# Patient Record
Sex: MALE | Race: WHITE | NOT HISPANIC OR LATINO | URBAN - METROPOLITAN AREA
[De-identification: names, ages, dates, MRNs, and addresses within clinical notes are randomized per-mention and may not be internally consistent; named-entity substitution may affect disease eponyms.]

---

## 2020-07-07 ENCOUNTER — INPATIENT (INPATIENT)
Facility: HOSPITAL | Age: 80
LOS: 6 days | Discharge: NURSING, REHAB OR LONG-TERM CARE | DRG: 435 | End: 2020-07-14
Attending: STUDENT IN AN ORGANIZED HEALTH CARE EDUCATION/TRAINING PROGRAM | Admitting: STUDENT IN AN ORGANIZED HEALTH CARE EDUCATION/TRAINING PROGRAM
Payer: MEDICARE

## 2020-07-07 VITALS
DIASTOLIC BLOOD PRESSURE: 56 MMHG | OXYGEN SATURATION: 94 % | WEIGHT: 164.91 LBS | RESPIRATION RATE: 18 BRPM | SYSTOLIC BLOOD PRESSURE: 94 MMHG | HEART RATE: 70 BPM | HEIGHT: 72 IN

## 2020-07-07 DIAGNOSIS — R18.8 OTHER ASCITES: ICD-10-CM

## 2020-07-07 DIAGNOSIS — D64.9 ANEMIA, UNSPECIFIED: ICD-10-CM

## 2020-07-07 DIAGNOSIS — I26.99 OTHER PULMONARY EMBOLISM WITHOUT ACUTE COR PULMONALE: ICD-10-CM

## 2020-07-07 DIAGNOSIS — E13.29 OTHER SPECIFIED DIABETES MELLITUS WITH OTHER DIABETIC KIDNEY COMPLICATION: ICD-10-CM

## 2020-07-07 DIAGNOSIS — R63.8 OTHER SYMPTOMS AND SIGNS CONCERNING FOOD AND FLUID INTAKE: ICD-10-CM

## 2020-07-07 LAB
ALBUMIN SERPL ELPH-MCNC: 1.8 G/DL — LOW (ref 3.4–5)
ALP SERPL-CCNC: 115 U/L — SIGNIFICANT CHANGE UP (ref 40–120)
ALT FLD-CCNC: 23 U/L — SIGNIFICANT CHANGE UP (ref 12–42)
AMMONIA BLD-MCNC: <10 UMOL/L — LOW (ref 11–32)
ANION GAP SERPL CALC-SCNC: 5 MMOL/L — LOW (ref 9–16)
APTT BLD: 69.3 SEC — HIGH (ref 27.5–35.5)
AST SERPL-CCNC: 27 U/L — SIGNIFICANT CHANGE UP (ref 15–37)
BILIRUB SERPL-MCNC: 0.4 MG/DL — SIGNIFICANT CHANGE UP (ref 0.2–1.2)
BUN SERPL-MCNC: 27 MG/DL — HIGH (ref 7–23)
CALCIUM SERPL-MCNC: 8.8 MG/DL — SIGNIFICANT CHANGE UP (ref 8.5–10.5)
CHLORIDE SERPL-SCNC: 104 MMOL/L — SIGNIFICANT CHANGE UP (ref 96–108)
CK SERPL-CCNC: 415 U/L — HIGH (ref 39–308)
CO2 SERPL-SCNC: 28 MMOL/L — SIGNIFICANT CHANGE UP (ref 22–31)
CREAT SERPL-MCNC: 1.08 MG/DL — SIGNIFICANT CHANGE UP (ref 0.5–1.3)
ETHANOL SERPL-MCNC: <3 MG/DL — SIGNIFICANT CHANGE UP
GLUCOSE BLDC GLUCOMTR-MCNC: 139 MG/DL — HIGH (ref 70–99)
GLUCOSE BLDC GLUCOMTR-MCNC: 153 MG/DL — HIGH (ref 70–99)
GLUCOSE SERPL-MCNC: 165 MG/DL — HIGH (ref 70–99)
HCT VFR BLD CALC: 23.4 % — LOW (ref 39–50)
HCT VFR BLD CALC: 26.3 % — LOW (ref 39–50)
HGB BLD-MCNC: 7.6 G/DL — LOW (ref 13–17)
HGB BLD-MCNC: 8.2 G/DL — LOW (ref 13–17)
INR BLD: 1.47 — HIGH (ref 0.88–1.16)
LACTATE SERPL-SCNC: 1.7 MMOL/L — SIGNIFICANT CHANGE UP (ref 0.4–2)
LIDOCAIN IGE QN: 21 U/L — LOW (ref 73–393)
MCHC RBC-ENTMCNC: 26.6 PG — LOW (ref 27–34)
MCHC RBC-ENTMCNC: 27.1 PG — SIGNIFICANT CHANGE UP (ref 27–34)
MCHC RBC-ENTMCNC: 31.2 GM/DL — LOW (ref 32–36)
MCHC RBC-ENTMCNC: 32.5 GM/DL — SIGNIFICANT CHANGE UP (ref 32–36)
MCV RBC AUTO: 83.6 FL — SIGNIFICANT CHANGE UP (ref 80–100)
MCV RBC AUTO: 85.4 FL — SIGNIFICANT CHANGE UP (ref 80–100)
NRBC # BLD: 0 /100 WBCS — SIGNIFICANT CHANGE UP (ref 0–0)
NRBC # BLD: 0 /100 WBCS — SIGNIFICANT CHANGE UP (ref 0–0)
PCO2 BLDV: 40 MMHG — LOW (ref 41–51)
PH BLDV: 7.43 — SIGNIFICANT CHANGE UP (ref 7.32–7.43)
PLATELET # BLD AUTO: 155 K/UL — SIGNIFICANT CHANGE UP (ref 150–400)
PLATELET # BLD AUTO: 162 K/UL — SIGNIFICANT CHANGE UP (ref 150–400)
PO2 BLDV: 32 MMHG — LOW (ref 35–40)
POTASSIUM SERPL-MCNC: 4.1 MMOL/L — SIGNIFICANT CHANGE UP (ref 3.5–5.3)
POTASSIUM SERPL-SCNC: 4.1 MMOL/L — SIGNIFICANT CHANGE UP (ref 3.5–5.3)
PROT SERPL-MCNC: 6.7 G/DL — SIGNIFICANT CHANGE UP (ref 6.4–8.2)
PROTHROM AB SERPL-ACNC: 17 SEC — HIGH (ref 10.6–13.6)
RBC # BLD: 2.8 M/UL — LOW (ref 4.2–5.8)
RBC # BLD: 3.08 M/UL — LOW (ref 4.2–5.8)
RBC # FLD: 14.1 % — SIGNIFICANT CHANGE UP (ref 10.3–14.5)
RBC # FLD: 14.2 % — SIGNIFICANT CHANGE UP (ref 10.3–14.5)
SAO2 % BLDV: 61 % — SIGNIFICANT CHANGE UP
SARS-COV-2 RNA SPEC QL NAA+PROBE: SIGNIFICANT CHANGE UP
SODIUM SERPL-SCNC: 137 MMOL/L — SIGNIFICANT CHANGE UP (ref 132–145)
TROPONIN I SERPL-MCNC: 0.03 NG/ML — SIGNIFICANT CHANGE UP (ref 0.02–0.06)
TSH SERPL-MCNC: 2.57 UIU/ML — SIGNIFICANT CHANGE UP (ref 0.36–3.74)
WBC # BLD: 12.55 K/UL — HIGH (ref 3.8–10.5)
WBC # BLD: 8.52 K/UL — SIGNIFICANT CHANGE UP (ref 3.8–10.5)
WBC # FLD AUTO: 12.55 K/UL — HIGH (ref 3.8–10.5)
WBC # FLD AUTO: 8.52 K/UL — SIGNIFICANT CHANGE UP (ref 3.8–10.5)

## 2020-07-07 PROCEDURE — 99222 1ST HOSP IP/OBS MODERATE 55: CPT

## 2020-07-07 PROCEDURE — 70450 CT HEAD/BRAIN W/O DYE: CPT | Mod: 26

## 2020-07-07 PROCEDURE — 99285 EMERGENCY DEPT VISIT HI MDM: CPT

## 2020-07-07 PROCEDURE — 93010 ELECTROCARDIOGRAM REPORT: CPT

## 2020-07-07 PROCEDURE — 74177 CT ABD & PELVIS W/CONTRAST: CPT | Mod: 26

## 2020-07-07 PROCEDURE — 99223 1ST HOSP IP/OBS HIGH 75: CPT | Mod: GC

## 2020-07-07 PROCEDURE — 71275 CT ANGIOGRAPHY CHEST: CPT | Mod: 26

## 2020-07-07 PROCEDURE — 71045 X-RAY EXAM CHEST 1 VIEW: CPT | Mod: 26

## 2020-07-07 PROCEDURE — 74183 MRI ABD W/O CNTR FLWD CNTR: CPT | Mod: 26

## 2020-07-07 RX ORDER — ONDANSETRON 8 MG/1
4 TABLET, FILM COATED ORAL ONCE
Refills: 0 | Status: COMPLETED | OUTPATIENT
Start: 2020-07-07 | End: 2020-07-07

## 2020-07-07 RX ORDER — HEPARIN SODIUM 5000 [USP'U]/ML
6000 INJECTION INTRAVENOUS; SUBCUTANEOUS ONCE
Refills: 0 | Status: COMPLETED | OUTPATIENT
Start: 2020-07-07 | End: 2020-07-07

## 2020-07-07 RX ORDER — SODIUM CHLORIDE 9 MG/ML
1000 INJECTION INTRAMUSCULAR; INTRAVENOUS; SUBCUTANEOUS ONCE
Refills: 0 | Status: COMPLETED | OUTPATIENT
Start: 2020-07-07 | End: 2020-07-07

## 2020-07-07 RX ORDER — IOHEXOL 300 MG/ML
30 INJECTION, SOLUTION INTRAVENOUS ONCE
Refills: 0 | Status: COMPLETED | OUTPATIENT
Start: 2020-07-07 | End: 2020-07-07

## 2020-07-07 RX ORDER — HEPARIN SODIUM 5000 [USP'U]/ML
INJECTION INTRAVENOUS; SUBCUTANEOUS
Qty: 25000 | Refills: 0 | Status: DISCONTINUED | OUTPATIENT
Start: 2020-07-07 | End: 2020-07-08

## 2020-07-07 RX ORDER — HEPARIN SODIUM 5000 [USP'U]/ML
3000 INJECTION INTRAVENOUS; SUBCUTANEOUS EVERY 6 HOURS
Refills: 0 | Status: DISCONTINUED | OUTPATIENT
Start: 2020-07-07 | End: 2020-07-07

## 2020-07-07 RX ORDER — HEPARIN SODIUM 5000 [USP'U]/ML
6000 INJECTION INTRAVENOUS; SUBCUTANEOUS EVERY 6 HOURS
Refills: 0 | Status: DISCONTINUED | OUTPATIENT
Start: 2020-07-07 | End: 2020-07-07

## 2020-07-07 RX ORDER — SODIUM CHLORIDE 9 MG/ML
1000 INJECTION, SOLUTION INTRAVENOUS
Refills: 0 | Status: DISCONTINUED | OUTPATIENT
Start: 2020-07-07 | End: 2020-07-08

## 2020-07-07 RX ORDER — SENNA PLUS 8.6 MG/1
2 TABLET ORAL AT BEDTIME
Refills: 0 | Status: DISCONTINUED | OUTPATIENT
Start: 2020-07-07 | End: 2020-07-14

## 2020-07-07 RX ADMIN — SODIUM CHLORIDE 80 MILLILITER(S): 9 INJECTION, SOLUTION INTRAVENOUS at 18:18

## 2020-07-07 RX ADMIN — HEPARIN SODIUM 6000 UNIT(S): 5000 INJECTION INTRAVENOUS; SUBCUTANEOUS at 11:52

## 2020-07-07 RX ADMIN — IOHEXOL 30 MILLILITER(S): 300 INJECTION, SOLUTION INTRAVENOUS at 06:33

## 2020-07-07 RX ADMIN — SENNA PLUS 2 TABLET(S): 8.6 TABLET ORAL at 20:51

## 2020-07-07 RX ADMIN — HEPARIN SODIUM 1300 UNIT(S)/HR: 5000 INJECTION INTRAVENOUS; SUBCUTANEOUS at 11:51

## 2020-07-07 RX ADMIN — ONDANSETRON 4 MILLIGRAM(S): 8 TABLET, FILM COATED ORAL at 06:32

## 2020-07-07 RX ADMIN — SODIUM CHLORIDE 1000 MILLILITER(S): 9 INJECTION INTRAMUSCULAR; INTRAVENOUS; SUBCUTANEOUS at 06:33

## 2020-07-07 NOTE — ED ADULT NURSE REASSESSMENT NOTE - NS ED NURSE REASSESS COMMENT FT1
pt laying on stretcher awake, alert and oriented x3, respirations even and unlabored. no c/o pain at this time. vital signs checked and WNL. will continue to monitor.

## 2020-07-07 NOTE — H&P ADULT - ASSESSMENT
79 yo male pmhx DM2 noninsulin dependent bibems for acute onset PE. Endorses 20lb weight loss, and back pain occurring with stomach pain 81 yo male pmhx DM2 noninsulin dependent bibems for acute onset PE. Endorses 20lb weight loss, and back pain occurring with stomach pain and now with cystic pancreatic mass and renal cyst with ascites and anemia

## 2020-07-07 NOTE — CONSULT NOTE ADULT - SUBJECTIVE AND OBJECTIVE BOX
GASTROENTEROLOGY CONSULT NOTE  HPI:  81 yo male pmhx DM2 noninsulin dependent bibems for acute onset profound weakness. Pt states he could not support himself last night and guided himself to the floor from which he could not stand up. He states he was on floor approximately 6-7 hours and was finally able to crawl to phone and call 911. Pt also endorses a back pain for 2 months that progressed to a back and stomach pain (does not endorse radiation). Simultaneously he has also noticed that he has decreased energy. Over the course of 3months 20lb weight loss with no change in appetite He denies cp/fever/cough/urinary incontinence/fever/chills. Pt endorses decreased appetite over past 2 months and abdominal distension x 2 months. Denies cp/soa. Denies black/bloody stools. (07 Jul 2020 13:53)    Allergies    No Known Allergies    Intolerances      Home Medications:  metFORMIN 500 mg oral tablet: 1 tab(s) orally 2 times a day (07 Jul 2020 06:19)    MEDICATIONS:  MEDICATIONS  (STANDING):  heparin  Infusion.  Unit(s)/Hr (13 mL/Hr) IV Continuous <Continuous>  lactated ringers. 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, with long-term current use of insulin  No significant past surgical history    FAMILY HISTORY:  No pertinent family history in first degree relatives    SOCIAL HISTORY:  [place]: Denies  Alcohol: Denies  Illicit Drugs: Denies    REVIEW OF SYSTEMS:  CONSTITUTIONAL: Weakness and fatigue as above  HEENT: No visual changes; No vertigo or throat pain   NECK: No pain or stiffness  RESPIRATORY: No cough, wheezing, hemoptysis; No shortness of breath  CARDIOVASCULAR: No chest pain or palpitations  GASTROINTESTINAL: Abdominal fullness and a "heat" through the abdomen, no nausea or vomiting  GENITOURINARY: No dysuria, frequency or hematuria  NEUROLOGICAL: No numbness or weakness  SKIN: No itching, burning, rashes, or lesions   All other 10 review of systems is negative unless indicated above.    Vital Signs Last 24 Hrs  T(C): 36.9 (07 Jul 2020 17:49), Max: 37.4 (07 Jul 2020 14:50)  T(F): 98.4 (07 Jul 2020 17:49), Max: 99.3 (07 Jul 2020 14:50)  HR: 84 (07 Jul 2020 18:06) (64 - 98)  BP: 95/59 (07 Jul 2020 18:06) (91/52 - 121/69)  BP(mean): 72 (07 Jul 2020 18:06) (67 - 72)  RR: 20 (07 Jul 2020 18:06) (16 - 20)  SpO2: 95% (07 Jul 2020 18:06) (94% - 96%)    07-07 @ 07:01  -  07-07 @ 18:43  --------------------------------------------------------  IN: 564 mL / OUT: 0 mL / NET: 564 mL        PHYSICAL EXAM:    General: Cachectic, thin  Eyes: Anicteric sclerae, moist conjunctivae  HENT: Moist mucous membranes  Neck: Trachea midline, supple  Lungs: Normal respiratory effort, no intercostal retractions  Cardiovascular: RRR  Abdomen: Mildly distended, non-tender  Extremities: Normal range of motion, No clubbing, cyanosis or edema  Neurological: Alert and oriented x3  Skin: Warm and dry. No obvious rash    LABS:                        8.2    12.55 )-----------( 162      ( 07 Jul 2020 17:50 )             26.3     07-07    137  |  104  |  27<H>  ----------------------------<  165<H>  4.1   |  28  |  1.08    Ca    8.8      07 Jul 2020 06:26    TPro  6.7  /  Alb  1.8<L>  /  TBili  0.4  /  DBili  x   /  AST  27  /  ALT  23  /  AlkPhos  115  07-07        PT/INR - ( 07 Jul 2020 06:26 )   PT: 17.0 sec;   INR: 1.47          PTT - ( 07 Jul 2020 17:50 )  PTT:69.3 sec    RADIOLOGY & ADDITIONAL STUDIES:     Reviewed

## 2020-07-07 NOTE — CONSULT NOTE ADULT - ASSESSMENT
81yo M with DM2 BIBEMS for weakness, inability to ambulate, prompting him to call EMS found to have b/l PEs and abnormal pancreatic imaging on CT.    1. Abnormal imaging - Patient with an abdominal "fullness and heat" which radiates to the back, has been ongoing for ~1 month. Denies nausea, vomiting, fevers, chills, abdominal pain. Found to have b/l PE and innumerable thin, septated, cystic pancreatic lesions. Questionable solid lesion in the stomach with occlusion of the splenic vein. Unclear at this time what the etiology of these cystic lesions is. Given solid component in the tail with constellation of weakness, cachexia, weight loss, and hypercoagulability, concern for malignancy.  - Obtain MR/MRCP to further characterize these findings  - Would recommend Heparin for anticoagulation at this time should EUS w/FNA be required  - Further recommendations pending MR    Recommendations discussed with primary team  Case discussed with attending physician 81yo M with DM2 BIBEMS for weakness, inability to ambulate, prompting him to call EMS found to have b/l PEs and abnormal pancreatic imaging on CT.    1. Abnormal imaging - Patient with an abdominal "fullness and heat" which radiates to the back, has been ongoing for ~1 month. Denies nausea, vomiting, fevers, chills, abdominal pain. Found to have b/l PE and innumerable thin, septated, cystic pancreatic lesions. Questionable solid lesion in the stomach with occlusion of the splenic vein. Unclear at this time what the etiology of these cystic lesions is. Given solid component in the tail with constellation of weakness, cachexia, weight loss, and hypercoagulability, concern for malignancy.  - Obtain MR/MRCP to further characterize these findings  - Patient reports recent hospitalization at [facility] with same complaints, believes he had a CT A/P at that time, would obtain any imaging to understand acuity of findings  - Would recommend Heparin for anticoagulation at this time should EUS w/FNA be required  - Further recommendations pending MR    Recommendations discussed with primary team  Case discussed with attending physician

## 2020-07-07 NOTE — ED ADULT NURSE NOTE - NSIMPLEMENTINTERV_GEN_ALL_ED
Implemented All Fall with Harm Risk Interventions:  [place] to call system. Call bell, personal items and telephone within reach. Instruct patient to call for assistance. Room bathroom lighting operational. Non-slip footwear when patient is off stretcher. Physically safe environment: no spills, clutter or unnecessary equipment. Stretcher in lowest position, wheels locked, appropriate side rails in place. Provide visual cue, wrist band, yellow gown, etc. Monitor gait and stability. Monitor for mental status changes and reorient to person, place, and time. Review medications for side effects contributing to fall risk. Reinforce activity limits and safety measures with patient and family. Provide visual clues: red socks.

## 2020-07-07 NOTE — ED ADULT TRIAGE NOTE - CHIEF COMPLAINT QUOTE
YISSEL from home after being found on the floor by ems. As per patient, he slid one foot to the floor from the sofa due to weakness and then was unable to get up for eight hours. eventually pt crawled to the phone and was able to call 911. Pt arrives alert awake and responsive, complains of abdominal and back pain.

## 2020-07-07 NOTE — H&P ADULT - NSHPPHYSICALEXAM_GEN_ALL_CORE
PHYSICAL EXAM:  	GEN: Cachetic elderly male, appears comfortable  	HEENT:  EOMI, dry MM, no pharyngeal erythema or exudate  	CV: S1/S2, no murmurs appreciated, no JVD, no carotid bruits  	RESP: CTA bilaterally, good respiratory effort, good air movement, no wheezes/rales  	ABD: tense BS+4quadrants, nontender, distended tense dullness to percussion in 4 quad   	EXTREMITIES: warm, pulses 2+ in all 4 extremities, +1 LE edema  	MSK: full range of motion of all 4 extremities  	SKIN: dry, intact, no rashes  	NEURO: A&Ox3, no focal deficits, strength 5/5 throughout, no sensory deficits. Tremulous.   PSYC: normal mood/affect

## 2020-07-07 NOTE — H&P ADULT - HISTORY OF PRESENT ILLNESS
79 yo male pmhx DM2 noninsulin dependent bibems for acute onset profound weakness. Pt states he could not support himself last night and guided himself to the floor from which he could not stand up. He states he was on floor approximately 6-7 hours and was finally able to crawl to phone and call 911. He denies cp/fever/cough/urinary incontinence/fever/chills. Pt endorses decreased appetite over past 2 months and abdominal distension x 2 months. Denies cp/soa. Denies black/bloody stools. 79 yo male pmhx DM2 noninsulin dependent bibems for acute onset profound weakness. Pt states he could not support himself last night and guided himself to the floor from which he could not stand up. He states he was on floor approximately 6-7 hours and was finally able to crawl to phone and call 911. Pt also endorses a back pain for 2 months that progressed to a back and stomach pain. Simultaneously he has also noticed that he has decreased energy. Over the course of 3months 20lb weight loss with no change in appetite He denies cp/fever/cough/urinary incontinence/fever/chills. Pt endorses decreased appetite over past 2 months and abdominal distension x 2 months. Denies cp/soa. Denies black/bloody stools. 79 yo male pmhx DM2 noninsulin dependent bibems for acute onset profound weakness. Pt states he could not support himself last night and guided himself to the floor from which he could not stand up. He states he was on floor approximately 6-7 hours and was finally able to crawl to phone and call 911. Pt also endorses a back pain for 2 months that progressed to a back and stomach pain (does not endorse radiation). Simultaneously he has also noticed that he has decreased energy. Over the course of 3months 20lb weight loss with no change in appetite He denies cp/fever/cough/urinary incontinence/fever/chills. Pt endorses decreased appetite over past 2 months and abdominal distension x 2 months. Denies cp/soa. Denies black/bloody stools.

## 2020-07-07 NOTE — ED PROVIDER NOTE - CLINICAL SUMMARY MEDICAL DECISION MAKING FREE TEXT BOX
79 yo male who lives alone with weakness biba unable to ambulate c/o progressive abdominal distention, weakness.   Diff dx includes malignant ascites, mass, sbo, uti, sepsis, rhabdomyosis.

## 2020-07-07 NOTE — ED PROVIDER NOTE - OBJECTIVE STATEMENT
79 yo male pmhx niddm biba from home (lives alone) with inability to stand up and profound weakness. Pt states he could not support himself last night and guided himself to the floor from which he could not stand up. He states he was on floor approximately 6-7 hours and was finally able to crawl to phone and call 911. He denies cp/fever/cough/urinary incontinence/fever/chills. Pt endorses decreased appetite over past 2 months and abdominal distension x 2 months. Denies cp/soa. Denies black/bloody stools.

## 2020-07-07 NOTE — H&P ADULT - NSHPLABSRESULTS_GEN_ALL_CORE
LABS:                         8.2    12.55 )-----------( 162      ( 07 Jul 2020 17:50 )             26.3     07-07    137  |  104  |  27<H>  ----------------------------<  165<H>  4.1   |  28  |  1.08    Ca    8.8      07 Jul 2020 06:26    TPro  6.7  /  Alb  1.8<L>  /  TBili  0.4  /  DBili  x   /  AST  27  /  ALT  23  /  AlkPhos  115  07-07    PT/INR - ( 07 Jul 2020 06:26 )   PT: 17.0 sec;   INR: 1.47          PTT - ( 07 Jul 2020 17:50 )  PTT:69.3 sec    CARDIAC MARKERS ( 07 Jul 2020 11:57 )  0.030 ng/mL / x     / x     / x     / x      CARDIAC MARKERS ( 07 Jul 2020 06:26 )  x     / x     / 415 U/L / x     / x            Lactate, Blood: 1.7 mmoL/L (07-07 @ 06:26)      RADIOLOGY, EKG & ADDITIONAL TESTS:

## 2020-07-07 NOTE — ED PROVIDER NOTE - PRINCIPAL DIAGNOSIS
Swelling that continues/Bleeding that does not stop/Persistent Nausea and Vomiting/Numbness, color, or temperature change to extremity
Other acute pulmonary embolism

## 2020-07-07 NOTE — CONSULT NOTE ADULT - ATTENDING COMMENTS
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

## 2020-07-07 NOTE — H&P ADULT - PROBLEM SELECTOR PLAN 3
-likely 2/2 malignancy - cystic and dense masses seen throughout the abd  - pending MRCP  - physical exam: warm distended abd w/ dullness to percussion

## 2020-07-07 NOTE — ED ADULT NURSE REASSESSMENT NOTE - NS ED NURSE REASSESS COMMENT FT1
rectal temp done on pt. 98.4. noted with redness to sacrum and small open area to sacral area. repositioned. transport here to take pt to [facility].

## 2020-07-07 NOTE — ED PROVIDER NOTE - CARE PLAN
Principal Discharge DX:	Other acute pulmonary embolism  Secondary Diagnosis:	Anemia, unspecified type

## 2020-07-08 DIAGNOSIS — I26.99 OTHER PULMONARY EMBOLISM WITHOUT ACUTE COR PULMONALE: ICD-10-CM

## 2020-07-08 DIAGNOSIS — J92.9 PLEURAL PLAQUE WITHOUT ASBESTOS: ICD-10-CM

## 2020-07-08 DIAGNOSIS — J90 PLEURAL EFFUSION, NOT ELSEWHERE CLASSIFIED: ICD-10-CM

## 2020-07-08 DIAGNOSIS — C25.9 MALIGNANT NEOPLASM OF PANCREAS, UNSPECIFIED: ICD-10-CM

## 2020-07-08 DIAGNOSIS — R53.1 WEAKNESS: ICD-10-CM

## 2020-07-08 LAB
ALBUMIN SERPL ELPH-MCNC: 1.9 G/DL — LOW (ref 3.3–5)
ALP SERPL-CCNC: 121 U/L — HIGH (ref 40–120)
ALT FLD-CCNC: 17 U/L — SIGNIFICANT CHANGE UP (ref 10–45)
ANION GAP SERPL CALC-SCNC: 11 MMOL/L — SIGNIFICANT CHANGE UP (ref 5–17)
APTT BLD: 47.3 SEC — HIGH (ref 27.5–35.5)
APTT BLD: 52.3 SEC — HIGH (ref 27.5–35.5)
APTT BLD: 57.4 SEC — HIGH (ref 27.5–35.5)
AST SERPL-CCNC: 39 U/L — SIGNIFICANT CHANGE UP (ref 10–40)
BASOPHILS # BLD AUTO: 0 K/UL — SIGNIFICANT CHANGE UP (ref 0–0.2)
BASOPHILS NFR BLD AUTO: 0 % — SIGNIFICANT CHANGE UP (ref 0–2)
BILIRUB SERPL-MCNC: 0.4 MG/DL — SIGNIFICANT CHANGE UP (ref 0.2–1.2)
BUN SERPL-MCNC: 20 MG/DL — SIGNIFICANT CHANGE UP (ref 7–23)
CALCIUM SERPL-MCNC: 7.7 MG/DL — LOW (ref 8.4–10.5)
CEA SERPL-MCNC: 30.8 NG/ML — HIGH (ref 0–3.8)
CHLORIDE SERPL-SCNC: 104 MMOL/L — SIGNIFICANT CHANGE UP (ref 96–108)
CO2 SERPL-SCNC: 23 MMOL/L — SIGNIFICANT CHANGE UP (ref 22–31)
CREAT SERPL-MCNC: 0.71 MG/DL — SIGNIFICANT CHANGE UP (ref 0.5–1.3)
EOSINOPHIL # BLD AUTO: 0 K/UL — SIGNIFICANT CHANGE UP (ref 0–0.5)
EOSINOPHIL NFR BLD AUTO: 0 % — SIGNIFICANT CHANGE UP (ref 0–6)
FERRITIN SERPL-MCNC: 234 NG/ML — SIGNIFICANT CHANGE UP (ref 30–400)
FOLATE SERPL-MCNC: 6.4 NG/ML — SIGNIFICANT CHANGE UP
GLUCOSE BLDC GLUCOMTR-MCNC: 109 MG/DL — HIGH (ref 70–99)
GLUCOSE BLDC GLUCOMTR-MCNC: 127 MG/DL — HIGH (ref 70–99)
GLUCOSE BLDC GLUCOMTR-MCNC: 134 MG/DL — HIGH (ref 70–99)
GLUCOSE BLDC GLUCOMTR-MCNC: 172 MG/DL — HIGH (ref 70–99)
GLUCOSE SERPL-MCNC: 94 MG/DL — SIGNIFICANT CHANGE UP (ref 70–99)
HAPTOGLOB SERPL-MCNC: 272 MG/DL — HIGH (ref 34–200)
HCT VFR BLD CALC: 22.6 % — LOW (ref 39–50)
HCT VFR BLD CALC: 25.8 % — LOW (ref 39–50)
HGB BLD-MCNC: 7.3 G/DL — LOW (ref 13–17)
HGB BLD-MCNC: 8 G/DL — LOW (ref 13–17)
IRON SATN MFR SERPL: 7 % — LOW (ref 16–55)
IRON SATN MFR SERPL: 9 UG/DL — LOW (ref 45–165)
LYMPHOCYTES # BLD AUTO: 0.33 K/UL — LOW (ref 1–3.3)
LYMPHOCYTES # BLD AUTO: 3.5 % — LOW (ref 13–44)
MAGNESIUM SERPL-MCNC: 1.8 MG/DL — SIGNIFICANT CHANGE UP (ref 1.6–2.6)
MCHC RBC-ENTMCNC: 26.4 PG — LOW (ref 27–34)
MCHC RBC-ENTMCNC: 27 PG — SIGNIFICANT CHANGE UP (ref 27–34)
MCHC RBC-ENTMCNC: 31 GM/DL — LOW (ref 32–36)
MCHC RBC-ENTMCNC: 32.3 GM/DL — SIGNIFICANT CHANGE UP (ref 32–36)
MCV RBC AUTO: 83.7 FL — SIGNIFICANT CHANGE UP (ref 80–100)
MCV RBC AUTO: 85.1 FL — SIGNIFICANT CHANGE UP (ref 80–100)
MONOCYTES # BLD AUTO: 0.17 K/UL — SIGNIFICANT CHANGE UP (ref 0–0.9)
MONOCYTES NFR BLD AUTO: 1.8 % — LOW (ref 2–14)
NEUTROPHILS # BLD AUTO: 8.81 K/UL — HIGH (ref 1.8–7.4)
NEUTROPHILS NFR BLD AUTO: 79.8 % — HIGH (ref 43–77)
NRBC # BLD: 0 /100 WBCS — SIGNIFICANT CHANGE UP (ref 0–0)
OB PNL STL: NEGATIVE — SIGNIFICANT CHANGE UP
PHOSPHATE SERPL-MCNC: 2.7 MG/DL — SIGNIFICANT CHANGE UP (ref 2.5–4.5)
PLATELET # BLD AUTO: 159 K/UL — SIGNIFICANT CHANGE UP (ref 150–400)
PLATELET # BLD AUTO: 162 K/UL — SIGNIFICANT CHANGE UP (ref 150–400)
POTASSIUM SERPL-MCNC: 3.9 MMOL/L — SIGNIFICANT CHANGE UP (ref 3.5–5.3)
POTASSIUM SERPL-SCNC: 3.9 MMOL/L — SIGNIFICANT CHANGE UP (ref 3.5–5.3)
PROT SERPL-MCNC: 5.8 G/DL — LOW (ref 6–8.3)
RBC # BLD: 2.7 M/UL — LOW (ref 4.2–5.8)
RBC # BLD: 2.7 M/UL — LOW (ref 4.2–5.8)
RBC # BLD: 3.03 M/UL — LOW (ref 4.2–5.8)
RBC # FLD: 14.3 % — SIGNIFICANT CHANGE UP (ref 10.3–14.5)
RBC # FLD: 14.4 % — SIGNIFICANT CHANGE UP (ref 10.3–14.5)
RETICS #: 42.1 K/UL — SIGNIFICANT CHANGE UP (ref 25–125)
RETICS/RBC NFR: 1.6 % — SIGNIFICANT CHANGE UP (ref 0.5–2.5)
SODIUM SERPL-SCNC: 138 MMOL/L — SIGNIFICANT CHANGE UP (ref 135–145)
TIBC SERPL-MCNC: 130 UG/DL — LOW (ref 220–430)
UIBC SERPL-MCNC: 121 UG/DL — SIGNIFICANT CHANGE UP (ref 110–370)
VIT B12 SERPL-MCNC: 1250 PG/ML — HIGH (ref 232–1245)
WBC # BLD: 8.39 K/UL — SIGNIFICANT CHANGE UP (ref 3.8–10.5)
WBC # BLD: 9.39 K/UL — SIGNIFICANT CHANGE UP (ref 3.8–10.5)
WBC # FLD AUTO: 8.39 K/UL — SIGNIFICANT CHANGE UP (ref 3.8–10.5)
WBC # FLD AUTO: 9.39 K/UL — SIGNIFICANT CHANGE UP (ref 3.8–10.5)

## 2020-07-08 PROCEDURE — 93306 TTE W/DOPPLER COMPLETE: CPT | Mod: 26

## 2020-07-08 PROCEDURE — 99233 SBSQ HOSP IP/OBS HIGH 50: CPT

## 2020-07-08 PROCEDURE — 99223 1ST HOSP IP/OBS HIGH 75: CPT | Mod: GC

## 2020-07-08 PROCEDURE — 99233 SBSQ HOSP IP/OBS HIGH 50: CPT | Mod: GC

## 2020-07-08 RX ORDER — MAGNESIUM SULFATE 500 MG/ML
2 VIAL (ML) INJECTION ONCE
Refills: 0 | Status: COMPLETED | OUTPATIENT
Start: 2020-07-08 | End: 2020-07-08

## 2020-07-08 RX ORDER — INSULIN LISPRO 100/ML
VIAL (ML) SUBCUTANEOUS
Refills: 0 | Status: DISCONTINUED | OUTPATIENT
Start: 2020-07-08 | End: 2020-07-14

## 2020-07-08 RX ORDER — HEPARIN SODIUM 5000 [USP'U]/ML
1400 INJECTION INTRAVENOUS; SUBCUTANEOUS
Qty: 25000 | Refills: 0 | Status: DISCONTINUED | OUTPATIENT
Start: 2020-07-08 | End: 2020-07-09

## 2020-07-08 RX ORDER — POLYETHYLENE GLYCOL 3350 17 G/17G
17 POWDER, FOR SOLUTION ORAL EVERY 24 HOURS
Refills: 0 | Status: DISCONTINUED | OUTPATIENT
Start: 2020-07-08 | End: 2020-07-14

## 2020-07-08 RX ADMIN — HEPARIN SODIUM 17 UNIT(S)/HR: 5000 INJECTION INTRAVENOUS; SUBCUTANEOUS at 17:57

## 2020-07-08 RX ADMIN — HEPARIN SODIUM 16 UNIT(S)/HR: 5000 INJECTION INTRAVENOUS; SUBCUTANEOUS at 09:08

## 2020-07-08 RX ADMIN — HEPARIN SODIUM 14 UNIT(S)/HR: 5000 INJECTION INTRAVENOUS; SUBCUTANEOUS at 00:53

## 2020-07-08 RX ADMIN — Medication 50 GRAM(S): at 09:27

## 2020-07-08 RX ADMIN — Medication 2: at 17:57

## 2020-07-08 NOTE — PROGRESS NOTE ADULT - PROBLEM SELECTOR PROBLEM 2
Malignant neoplasm of pancreas, unspecified location of malignancy R/O Malignant neoplasm of pancreas, unspecified location of malignancy

## 2020-07-08 NOTE — PROGRESS NOTE ADULT - PROBLEM SELECTOR PLAN 2
-s/p MRCP  -c/b ascites  -consult GI  -IR for diagnostic tap tomorrow - hold heparin for 30 minutes beforehand  -onc consult after diagnostic tap for recs - s/p MRCP; consult GI   - scheduled for diagnostic paracentesis tomorrow (7/9) with IR - hold heparin 30 minutes before procedure   - if cytology shows malignancy, get oncology consult for recs and palliative consult for goals of care

## 2020-07-08 NOTE — PROGRESS NOTE ADULT - PROBLEM SELECTOR PLAN 3
- likely ACD  -Hb dropped 8.2 to 7.3  -f/u Hb at 2pm  -stool guiac  -trend H/H  -transfuse below 7  - f/u TIBC, iron sat - Likely related to malignancy; pt hasn't had any signs of blood loss  - continue trending H/H; transfuse if <7  - f/u TIBC, iron sat.   - obtain stool guaiac

## 2020-07-08 NOTE — CONSULT NOTE ADULT - ASSESSMENT
This is an 81 y/o man with a hx of diabetes who presents with abdominal distention and was found to have evidence of metastatic malignancy possibly pancreatic given mass on CT. The pt was found to have b/l submassive PE's.

## 2020-07-08 NOTE — PROGRESS NOTE ADULT - PROBLEM SELECTOR PLAN 1
- b/l extensive PE, confirmed w/ CT  -f/u echo for RHS  -PTT goal- 58-99  -PTT subtherapeutic, Heparin drip increased to 16 mL/hr  -f/u PTTs q6hrs  -discuss risks and benefits of SVC filter vs heparin - b/l extensive PE, confirmed w/ CT  -echo: no evidence of RHS  -PTT goal- 58-99  -PTT subtherapeutic, Heparin drip increased to 17 mL/hr  -f/u PTTs q6hrs  -discuss risks and benefits of SVC filter vs heparin - possibly start long-term NOAC when done receiving procedures

## 2020-07-08 NOTE — PROGRESS NOTE ADULT - SUBJECTIVE AND OBJECTIVE BOX
CC: Patient is a 80y old  Male who presents with a chief complaint of Pulmonary embolsim (08 Jul 2020 09:29)      OVERNIGHT EVENTS:  MRCP done yesterday showing suspected malignant pancreatic cancer    SUBJECTIVE / INTERVAL HPI: Patient seen and examined at bedside. No complaints currently, denying nausea, vomiting, diarrhea, constipation, abdominal pain, chest pain, sob, cough, leg pain.    ROS: negative unless otherwise stated above.    VITAL SIGNS:  Vital Signs Last 24 Hrs  T(C): 36.3 (08 Jul 2020 08:50), Max: 37.4 (07 Jul 2020 14:50)  T(F): 97.4 (08 Jul 2020 08:50), Max: 99.3 (07 Jul 2020 14:50)  HR: 82 (08 Jul 2020 08:20) (68 - 98)  BP: 105/57 (08 Jul 2020 08:20) (91/52 - 115/72)  BP(mean): 76 (08 Jul 2020 08:20) (67 - 88)  RR: 22 (08 Jul 2020 08:20) (16 - 26)  SpO2: 92% (08 Jul 2020 08:20) (92% - 98%)    PHYSICAL EXAM:    General: thin appearing, nontoxic  Neck: supple  Cardiovascular: +S1/S2; RRR  Respiratory: CTA B/L; no W/R/R  Gastrointestinal: soft, diffusely distender but nontender; + fluid wave, +BSx4  Extremities: WWP; no edema, clubbing or cyanosis  Vascular: 2+ radial, DP/PT pulses B/L  Neurological: AAOx3; no focal deficits    MEDICATIONS:  MEDICATIONS  (STANDING):  heparin  Infusion 1400 Unit(s)/Hr (16 mL/Hr) IV Continuous <Continuous>  insulin lispro (HumaLOG) corrective regimen sliding scale   SubCutaneous Before meals and at bedtime  senna 2 Tablet(s) Oral at bedtime    MEDICATIONS  (PRN):      ALLERGIES:  Allergies    No Known Allergies    Intolerances        LABS:                        7.3    8.39  )-----------( 162      ( 08 Jul 2020 07:12 )             22.6     07-08    138  |  104  |  20  ----------------------------<  94  3.9   |  23  |  0.71    Ca    7.7<L>      08 Jul 2020 07:12  Phos  2.7     07-08  Mg     1.8     07-08    TPro  5.8<L>  /  Alb  1.9<L>  /  TBili  0.4  /  DBili  x   /  AST  39  /  ALT  17  /  AlkPhos  121<H>  07-08    PT/INR - ( 07 Jul 2020 06:26 )   PT: 17.0 sec;   INR: 1.47          PTT - ( 08 Jul 2020 07:12 )  PTT:47.3 sec    CAPILLARY BLOOD GLUCOSE      POCT Blood Glucose.: 134 mg/dL (08 Jul 2020 11:27)      RADIOLOGY & ADDITIONAL TESTS: Reviewed. CC: Patient is a 80y old  Male who presents with a chief complaint of Pulmonary embolsim (08 Jul 2020 09:29)    Hospital Course:  81 yo male pmhx DM2 noninsulin dependent bibems for acute onset profound weakness. Pt states he could not support himself last night and guided himself to the floor from which he could not stand up. He states he was on floor approximately 6-7 hours and was finally able to crawl to phone and call 911. Pt also endorses a back pain for 2 months that progressed to a back and stomach pain (does not endorse radiation). Simultaneously he has also noticed that he has decreased energy. Over the course of 3months 20lb weight loss with no change in appetite He denies cp/fever/cough/urinary incontinence/fever/chills. Pt endorses decreased appetite over past 2 months and abdominal distension x 2 months. Denies cp/soa. Denies black/bloody stools. CT showed b/l PEs for which he was started on heparin g++. 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 IR consulted for diagnostic paracentesis 7/9. Echo showed no evidence of right heart strain. Monitored Hb, stool guiac negative.  OVERNIGHT EVENTS:  MRCP done yesterday showing suspected malignant pancreatic cancer    SUBJECTIVE / INTERVAL HPI: Patient seen and examined at bedside. No complaints currently, denying nausea, vomiting, diarrhea, constipation, abdominal pain, chest pain, sob, cough, leg pain.    ROS: negative unless otherwise stated above.    VITAL SIGNS:  Vital Signs Last 24 Hrs  T(C): 36.3 (08 Jul 2020 08:50), Max: 37.4 (07 Jul 2020 14:50)  T(F): 97.4 (08 Jul 2020 08:50), Max: 99.3 (07 Jul 2020 14:50)  HR: 82 (08 Jul 2020 08:20) (68 - 98)  BP: 105/57 (08 Jul 2020 08:20) (91/52 - 115/72)  BP(mean): 76 (08 Jul 2020 08:20) (67 - 88)  RR: 22 (08 Jul 2020 08:20) (16 - 26)  SpO2: 92% (08 Jul 2020 08:20) (92% - 98%)    PHYSICAL EXAM:    General: thin appearing, nontoxic  Neck: supple  Cardiovascular: +S1/S2; RRR  Respiratory: CTA B/L; no W/R/R  Gastrointestinal: soft, diffusely distender but nontender; + fluid wave, +BSx4  Extremities: WWP; no edema, clubbing or cyanosis  Vascular: 2+ radial, DP/PT pulses B/L  Neurological: AAOx3; no focal deficits    MEDICATIONS:  MEDICATIONS  (STANDING):  heparin  Infusion 1400 Unit(s)/Hr (16 mL/Hr) IV Continuous <Continuous>  insulin lispro (HumaLOG) corrective regimen sliding scale   SubCutaneous Before meals and at bedtime  senna 2 Tablet(s) Oral at bedtime    MEDICATIONS  (PRN):      ALLERGIES:  Allergies    No Known Allergies    Intolerances        LABS:                        7.3    8.39  )-----------( 162      ( 08 Jul 2020 07:12 )             22.6     07-08    138  |  104  |  20  ----------------------------<  94  3.9   |  23  |  0.71    Ca    7.7<L>      08 Jul 2020 07:12  Phos  2.7     07-08  Mg     1.8     07-08    TPro  5.8<L>  /  Alb  1.9<L>  /  TBili  0.4  /  DBili  x   /  AST  39  /  ALT  17  /  AlkPhos  121<H>  07-08    PT/INR - ( 07 Jul 2020 06:26 )   PT: 17.0 sec;   INR: 1.47          PTT - ( 08 Jul 2020 07:12 )  PTT:47.3 sec    CAPILLARY BLOOD GLUCOSE      POCT Blood Glucose.: 134 mg/dL (08 Jul 2020 11:27)      RADIOLOGY & ADDITIONAL TESTS: Reviewed. CC: Patient is a 80y old  Male who presents with a chief complaint of Pulmonary embolsim (08 Jul 2020 09:29)    Hospital Course:  79 yo male pmhx DM2 noninsulin dependent bibems for acute onset profound weakness. Pt states he could not support himself last night and guided himself to the floor from which he could not stand up. He states he was on floor approximately 6-7 hours and was finally able to crawl to phone and call 911. Pt also endorses a back pain for 2 months that progressed to a back and stomach pain (does not endorse radiation). Simultaneously he has also noticed that he has decreased energy. Over the course of 3months 20lb weight loss with no change in appetite He denies cp/fever/cough/urinary incontinence/fever/chills. Pt endorses decreased appetite over past 2 months and abdominal distension x 2 months. Denies cp/soa. Denies black/bloody stools. CT showed b/l PEs for which he was started on heparin g++. 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 IR consulted for diagnostic paracentesis 7/9. Echo showed no evidence of right heart strain. Monitored Hb, stool guiac negative. Heparin being adjusted to therapeutic level.    OVERNIGHT EVENTS:  MRCP done yesterday showing suspected malignant pancreatic cancer    SUBJECTIVE / INTERVAL HPI: Patient seen and examined at bedside. No complaints currently, denying nausea, vomiting, diarrhea, constipation, abdominal pain, chest pain, sob, cough, leg pain.    ROS: negative unless otherwise stated above.    VITAL SIGNS:  Vital Signs Last 24 Hrs  T(C): 36.3 (08 Jul 2020 08:50), Max: 37.4 (07 Jul 2020 14:50)  T(F): 97.4 (08 Jul 2020 08:50), Max: 99.3 (07 Jul 2020 14:50)  HR: 82 (08 Jul 2020 08:20) (68 - 98)  BP: 105/57 (08 Jul 2020 08:20) (91/52 - 115/72)  BP(mean): 76 (08 Jul 2020 08:20) (67 - 88)  RR: 22 (08 Jul 2020 08:20) (16 - 26)  SpO2: 92% (08 Jul 2020 08:20) (92% - 98%)    PHYSICAL EXAM:    General: thin appearing, nontoxic  Neck: supple  Cardiovascular: +S1/S2; RRR  Respiratory: CTA B/L; no W/R/R  Gastrointestinal: soft, diffusely distender but nontender; + fluid wave, +BSx4  Extremities: WWP; no edema, clubbing or cyanosis  Vascular: 2+ radial, DP/PT pulses B/L  Neurological: AAOx3; no focal deficits    MEDICATIONS:  MEDICATIONS  (STANDING):  heparin  Infusion 1400 Unit(s)/Hr (16 mL/Hr) IV Continuous <Continuous>  insulin lispro (HumaLOG) corrective regimen sliding scale   SubCutaneous Before meals and at bedtime  senna 2 Tablet(s) Oral at bedtime    MEDICATIONS  (PRN):      ALLERGIES:  Allergies    No Known Allergies    Intolerances        LABS:                        7.3    8.39  )-----------( 162      ( 08 Jul 2020 07:12 )             22.6     07-08    138  |  104  |  20  ----------------------------<  94  3.9   |  23  |  0.71    Ca    7.7<L>      08 Jul 2020 07:12  Phos  2.7     07-08  Mg     1.8     07-08    TPro  5.8<L>  /  Alb  1.9<L>  /  TBili  0.4  /  DBili  x   /  AST  39  /  ALT  17  /  AlkPhos  121<H>  07-08    PT/INR - ( 07 Jul 2020 06:26 )   PT: 17.0 sec;   INR: 1.47          PTT - ( 08 Jul 2020 07:12 )  PTT:47.3 sec    CAPILLARY BLOOD GLUCOSE      POCT Blood Glucose.: 134 mg/dL (08 Jul 2020 11:27)      RADIOLOGY & ADDITIONAL TESTS: Reviewed.

## 2020-07-08 NOTE — PROGRESS NOTE ADULT - ASSESSMENT
79 yo male pmhx DM2 noninsulin dependent bibems for acute onset PE with finding of malignant pancreatic cancer on MRCP.

## 2020-07-08 NOTE — PROGRESS NOTE ADULT - ASSESSMENT
81yo M with DM2 BIBEMS for weakness, inability to ambulate, prompting him to call EMS found to have b/l PEs and abnormal pancreatic imaging on CT.    1. Abnormal imaging - Patient with an abdominal "fullness and heat" which radiates to the back, has been ongoing for ~1 month. Denies nausea, vomiting, fevers, chills, abdominal pain. Found to have b/l PE and innumerable thin, septated, cystic pancreatic lesions. Questionable solid lesion in the stomach with occlusion of the splenic vein. Reviewed CT and MRCP. 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 Question of malignant transformation which is possible, however, therapy would require total pancreatectomy if surgical candidate, or aggressive chemotherapy depending on functional status.  - Can consider EUS w/FNA to obtain diagnosis, however, patient currently deferring and ultimately unlikely to   - Obtain paracentesis with cytology to evaluate for evidence of metastatic disease  - Consider palliative care consultation    Recommendations discussed with primary team  Case discussed with attending physician

## 2020-07-08 NOTE — PROGRESS NOTE ADULT - SUBJECTIVE AND OBJECTIVE BOX
GASTROENTEROLOGY PROGRESS NOTE  Patient seen and examined at bedside. Patient reports feeling weak, but denies any other complaints this morning. Denies fevers, chills, nausea, abdominal pain.    PERTINENT REVIEW OF SYSTEMS:  CONSTITUTIONAL: No weakness, fevers or chills  HEENT: No visual changes; No vertigo or throat pain   GASTROINTESTINAL: No abdominal or epigastric pain. No nausea, vomiting, or hematemesis; No diarrhea or constipation. No melena or hematochezia.  NEUROLOGICAL: No numbness or weakness  SKIN: No itching, burning, rashes, or lesions     Allergies    No Known Allergies    Intolerances      MEDICATIONS:  MEDICATIONS  (STANDING):  heparin  Infusion 1400 Unit(s)/Hr (17 mL/Hr) IV Continuous <Continuous>  insulin lispro (HumaLOG) corrective regimen sliding scale   SubCutaneous Before meals and at bedtime  polyethylene glycol 3350 17 Gram(s) Oral every 24 hours  senna 2 Tablet(s) Oral at bedtime    MEDICATIONS  (PRN):    Vital Signs Last 24 Hrs  T(C): 36.2 (08 Jul 2020 14:09), Max: 37.1 (08 Jul 2020 05:28)  T(F): 97.1 (08 Jul 2020 14:09), Max: 98.8 (08 Jul 2020 05:28)  HR: 97 (08 Jul 2020 13:10) (68 - 97)  BP: 113/65 (08 Jul 2020 13:10) (97/57 - 115/72)  BP(mean): 84 (08 Jul 2020 13:10) (72 - 88)  RR: 23 (08 Jul 2020 13:10) (18 - 26)  SpO2: 90% (08 Jul 2020 13:10) (90% - 98%)    07-07 @ 07:01 - 07-08 @ 07:00  --------------------------------------------------------  IN: 1607 mL / OUT: 700 mL / NET: 907 mL    07-08 @ 07:01 - 07-08 @ 18:41  --------------------------------------------------------  IN: 634 mL / OUT: 600 mL / NET: 34 mL      PHYSICAL EXAM:    General: Well developed; well nourished; in no acute distress  HEENT: MMM, conjunctiva and sclera clear  Gastrointestinal: Soft non-tender non-distended; Normal bowel sounds; No hepatosplenomegaly. No rebound or guarding  Skin: Warm and dry. No obvious rash    LABS:                        8.0    9.39  )-----------( 159      ( 08 Jul 2020 15:12 )             25.8     07-08    138  |  104  |  20  ----------------------------<  94  3.9   |  23  |  0.71    Ca    7.7<L>      08 Jul 2020 07:12  Phos  2.7     07-08  Mg     1.8     07-08    TPro  5.8<L>  /  Alb  1.9<L>  /  TBili  0.4  /  DBili  x   /  AST  39  /  ALT  17  /  AlkPhos  121<H>  07-08    PT/INR - ( 07 Jul 2020 06:26 )   PT: 17.0 sec;   INR: 1.47          PTT - ( 08 Jul 2020 15:12 )  PTT:57.4 sec                  RADIOLOGY & ADDITIONAL STUDIES:  Reviewed

## 2020-07-08 NOTE — CHART NOTE - NSCHARTNOTEFT_GEN_A_CORE
Upon Nutritional Assessment by the Registered Dietitian your patient was determined to meet criteria / has evidence of the following diagnosis/diagnoses:          [ ]  Mild Protein Calorie Malnutrition        [ ]  Moderate Protein Calorie Malnutrition        [x ] Severe Protein Calorie Malnutrition        [ ] Unspecified Protein Calorie Malnutrition        [ ] Underweight / BMI <19        [ ] Morbid Obesity / BMI > 40      Findings as based on:  •  Comprehensive nutrition assessment and consultation    >> 2+ BL ankle/foot edema  >> Moderate/Severe wasting/loss to muscle/fat  >> Decreased PO intake x2 months  >> ~10.8% body wt loss x3 months       Recommendations:  1. Pt on regular diet at this time.  >> Monitor %PO intake, if intake improves consider adding low sodium to order.   >> Recommend use of oral nutrition supplements, suggest glucerna x3/day as oral nutrition supplements (220kcal/10gm prot per 1 can).   2. Monitor diet tolerance.  3. Monitor Skin, Wts, GI, GOC.  4. GI/Pain management per team- optimize as pt reports decreased PO 2/2 Pain.  5. Labs- monitor BMP, CBC, glucose, lytes, trend renal indices, LFTs, POCT, ?A1c.  6. RD to remain available for additional nutrition interventions as needed.     PROVIDER Section:     By signing this assessment you are acknowledging and agree with the diagnosis/diagnoses assigned by the Registered Dietitian    Comments:

## 2020-07-08 NOTE — PROGRESS NOTE ADULT - PROBLEM SELECTOR PLAN 4
-non-insulin dependent - home meds metformin 500mg BID  -POCGT QID  -low dose sliding scale - hold home med, metformin 500mg BID  - continue low dose sliding scale; POCGT QID

## 2020-07-08 NOTE — CONSULT NOTE ADULT - PROBLEM SELECTOR RECOMMENDATION 2
-Trace simple effusions noted on CT scan with adjacent atelectasis.   -Likely related to large abdominal ascites. The lung parenchyma was reviewed and there is no evidence of metastatic disease. Likely this fluid is related to the patients abdominal process.   -Effusions are too small for any intervention and are not causing the patient any symptoms.   -Observe for now.

## 2020-07-08 NOTE — CHART NOTE - NSCHARTNOTEFT_GEN_A_CORE
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, with long-term current use of insulin  No significant past surgical history      Current Nutrition Order:    PO Intake: Good (%) [   ]  Fair (50-75%) [   ] Poor (<25%) [   ]    GI Issues:     Pain:    Skin Integrity:    Labs:   07-08    138  |  104  |  20  ----------------------------<  94  3.9   |  23  |  0.71    Ca    7.7<L>      08 Jul 2020 07:12  Phos  2.7     07-08  Mg     1.8     07-08    TPro  5.8<L>  /  Alb  1.9<L>  /  TBili  0.4  /  DBili  x   /  AST  39  /  ALT  17  /  AlkPhos  121<H>  07-08    CAPILLARY BLOOD GLUCOSE      POCT Blood Glucose.: 134 mg/dL (08 Jul 2020 11:27)  POCT Blood Glucose.: 109 mg/dL (08 Jul 2020 06:46)  POCT Blood Glucose.: 139 mg/dL (07 Jul 2020 16:22)    Nutritionally Pertinent Lab Values:    Medications:  MEDICATIONS  (STANDING):  heparin  Infusion 1400 Unit(s)/Hr (16 mL/Hr) IV Continuous <Continuous>  insulin lispro (HumaLOG) corrective regimen sliding scale   SubCutaneous Before meals and at bedtime  senna 2 Tablet(s) Oral at bedtime    MEDICATIONS  (PRN):      Admitted Anthropometrics:    Weight:  Daily     Daily     Weight Change:     Nutrition Focused Physical Exam: Completed [   ]  Unable to complete [   ]  Muscle Wasting:  Subcutaneous Fat Wasting:    Estimated energy needs:     Subjective:   81 yo male pmhx DM2 noninsulin dependent bibems for acute onset profound weakness, s/p fall. Pt also endorses a back pain for 2 months that progressed to a back and stomach pain (does not endorse radiation). Pt admitted with cystic pancreatic mass and renal cyst with ascites (likely 2/2 malignancy) and anemia. Pt is s/p MRCP 7/7- Intra and extrahepatic biliary dilatation, No cholelithiasis or  choledocholithiasis, prime consideration is malignant form of IPMN-intraductal papillary mucinous neoplasm. Also noted with b/l extensive PE, confirmed on CT.     Per H&P- Over the course of 3months 20 pounds weight loss, decreased appetite over past 2 months and abdominal distension x 2 months.       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, with long-term current use of insulin  No significant past surgical history      Current Nutrition Order: Regular Diet     PO Intake: Good (%) [   ]  Fair (50-75%) [   ] Poor (<25%) [ x ]    GI Issues: Noted distended, BM+ 7/6 per flow sheets, ABD pain noted     Pain: ABD pain noted    Skin Integrity: 2+ BL ankle/foot edema, no pressure ulcers     Labs:   07-08    138  |  104  |  20  ----------------------------<  94  3.9   |  23  |  0.71    Ca    7.7<L>      08 Jul 2020 07:12  Phos  2.7     07-08  Mg     1.8     07-08    TPro  5.8<L>  /  Alb  1.9<L>  /  TBili  0.4  /  DBili  x   /  AST  39  /  ALT  17  /  AlkPhos  121<H>  07-08    CAPILLARY BLOOD GLUCOSE      POCT Blood Glucose.: 134 mg/dL (08 Jul 2020 11:27)  POCT Blood Glucose.: 109 mg/dL (08 Jul 2020 06:46)  POCT Blood Glucose.: 139 mg/dL (07 Jul 2020 16:22)    Nutritionally Pertinent Lab Values:  ALK PHOS 121  AST/SGOT + ALT/SGPT WDL  No A1c noted     Medications:  MEDICATIONS  (STANDING):  heparin  Infusion 1400 Unit(s)/Hr (16 mL/Hr) IV Continuous <Continuous>  insulin lispro (HumaLOG) corrective regimen sliding scale   SubCutaneous Before meals and at bedtime  senna 2 Tablet(s) Oral at bedtime    MEDICATIONS  (PRN):      Admitted Anthropometrics:  6'0''  pounds +-10%  7/7 164 pounds  BMI 22.4 %IBW=92%     Nutrition Focused Physical Exam:   Muscle Wasting- Temporal [ Moderate ]  Clavicle/Pectoral [ Moderate/Severe ]  Shoulder/Deltoid [   ]  Scapula [   ]  Interosseous [   ]  Quadriceps [   ]  Gastrocnemius [   ]  Fat Wasting- Orbital [ Moderate ]  Buccal [   ]  Triceps [   ]  Rib [   ]  --> Suspect [PCM] 2/2 to physical assessment, [poor intake], and [wt loss]; please see malnutrition chart note.     Estimated energy needs:     Subjective:   81 yo male pmhx DM2 noninsulin dependent bibems for acute onset profound weakness, s/p fall. Pt also endorses a back pain for 2 months that progressed to a back and stomach pain (does not endorse radiation). Pt admitted with cystic pancreatic mass and renal cyst with ascites (likely 2/2 malignancy) and anemia. Pt is s/p MRCP 7/7- Intra and extrahepatic biliary dilatation, No cholelithiasis or  choledocholithiasis, prime consideration is malignant form of IPMN-intraductal papillary mucinous neoplasm. Also noted with b/l extensive PE, confirmed on CT.   Pt visited, seen alert in bed. Reporters decreased PO intake PTA 2/2 Pain. Pt not taking ONS PTA. No issues chewing/swallowing, NKFA. Pt on regular diet at this time, limited PO intake reported, 25% of meals today, pt willing to consume ONS.     Per H&P- Over the course of 3months 20 pounds weight loss, decreased appetite over past 2 months and abdominal distension x 2 months.       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, with long-term current use of insulin  No significant past surgical history      Current Nutrition Order: Regular Diet     PO Intake: Good (%) [   ]  Fair (50-75%) [   ] Poor (<25%) [ x ]    GI Issues: Noted distended, BM+ 7/6 per flow sheets, ABD pain noted     Pain: ABD pain noted    Skin Integrity: 2+ BL ankle/foot edema, no pressure ulcers     Labs:   07-08    138  |  104  |  20  ----------------------------<  94  3.9   |  23  |  0.71    Ca    7.7<L>      08 Jul 2020 07:12  Phos  2.7     07-08  Mg     1.8     07-08    TPro  5.8<L>  /  Alb  1.9<L>  /  TBili  0.4  /  DBili  x   /  AST  39  /  ALT  17  /  AlkPhos  121<H>  07-08    CAPILLARY BLOOD GLUCOSE      POCT Blood Glucose.: 134 mg/dL (08 Jul 2020 11:27)  POCT Blood Glucose.: 109 mg/dL (08 Jul 2020 06:46)  POCT Blood Glucose.: 139 mg/dL (07 Jul 2020 16:22)    Nutritionally Pertinent Lab Values:  ALK PHOS 121  AST/SGOT + ALT/SGPT WDL  No A1c noted     Medications:  MEDICATIONS  (STANDING):  heparin  Infusion 1400 Unit(s)/Hr (16 mL/Hr) IV Continuous <Continuous>  insulin lispro (HumaLOG) corrective regimen sliding scale   SubCutaneous Before meals and at bedtime  senna 2 Tablet(s) Oral at bedtime    MEDICATIONS  (PRN):      Admitted Anthropometrics:  6'0''  pounds +-10%  7/7 164 pounds  BMI 22.4 %IBW=92%     Nutrition Focused Physical Exam:   Muscle Wasting- Temporal [ Moderate ]  Clavicle/Pectoral [ Moderate/Severe ]  Shoulder/Deltoid [   ]  Scapula [   ]  Interosseous [   ]  Quadriceps [   ]  Gastrocnemius [   ]  Fat Wasting- Orbital [ Moderate ]  Buccal [   ]  Triceps [   ]  Rib [   ]  --> Suspect [PCM] 2/2 to physical assessment, [poor intake], and [wt loss]; please see malnutrition chart note.     Estimated energy needs:   current body wt used for energy calculations as pt falls within % IBW  adjusted based on age, possible CA, suspected malnutrition, fluids per team   1875-2250kcal/day 25-30kcal/kg  90-105gm/day 1.2-1.4gm/kg     Subjective:   81 yo male pmhx DM2 noninsulin dependent bibems for acute onset profound weakness, s/p fall. Pt also endorses a back pain for 2 months that progressed to a back and stomach pain (does not endorse radiation). Pt admitted with cystic pancreatic mass and renal cyst with ascites (likely 2/2 malignancy) and anemia. Pt is s/p MRCP 7/7- Intra and extrahepatic biliary dilatation, No cholelithiasis or choledocholithiasis, prime consideration is malignant form of IPMN-intraductal papillary mucinous neoplasm. Also noted with b/l extensive PE, confirmed on CT.   Pt visited, seen alert in bed. Reporters decreased PO intake PTA 2/2 Pain. Pt not taking ONS PTA. Timeframe of decreased PO not stated per pt, however per H&P x2 months. Pt reports losing wt d/t decreased PO Intake, however amount not stated per pt, per H&P 20 pound wt loss x3 months; pt admitted with wt of 164 pounds, should pt have lost 20 pounds pt with presumed body wt loss of ~10.8% body wt loss. Pt on regular diet at this time, limited PO intake reported, 25% of meals today, pt willing to consume ONS.  No issues chewing/swallowing, NKFA.   Please see below for nutritions recommendations. Pending order placed. Paged team for Recs.     Nutrition Diagnosis: Suspected severe malnutrition RT presumed intake<EER 2/2 Pain AEB wt loss, edema, decreased PO, NFPE   Goal: Pt will meet at least 75% of nutrient needs / Show no further s/s of malnutrition     Recommendations:  1. Pt on regular diet at this time.  >> Monitor %PO intake, if intake improves consider adding low sodium to order.   >> Recommend use of oral nutrition supplements, suggest glucerna x3/day as oral nutrition supplements (220kcal/10gm prot per 1 can).   2. Monitor diet tolerance.  3. Monitor Skin, Wts, GOC.  4. GI + Pain management per team- optimize as pt reports decreased PO 2/2 Pain.  5. Labs- monitor BMP, CBC, glucose, lytes, trend renal indices, LFTs, POCT, ?A1c.  6. RD to remain available for additional nutrition interventions as needed.     Education: Encouraged PO intake during meals & reviewed importance- reviewed protein options and discussed oral nutrition supplements.   Risk Level: High [  X ] Moderate [   ] Low [   ]

## 2020-07-08 NOTE — CONSULT NOTE ADULT - SUBJECTIVE AND OBJECTIVE BOX
PULMONARY SERVICE INITIAL CONSULT NOTE    HPI:  81 yo male PMH of DM2 noninsulin dependent bibems for acute onset profound weakness. He had been having increased weakness, had been unable to move much at home, and had increased abdominal distention. He presented to [facility] where he had a CT abdomen done and large ascites, pancreatic mass, and evidence of peritoneal carcinomatosis were found and incidentally large PEs were also noted. This led to a dedicated CTA which demonstrated b/l PE's and thus the pulmonary consult service has been asked to evaluate the patient. The pt notes 20Lb weight loss in the past month, and increased weakness. He is a former smoker with a 30 PYH but quit 25 years ago. He denies any hx of lung disease. He denies any prior hx of VTE. He does not follow with doctors regularly and has never had a colonoscopy. He was placed on nasal cannula in the ED but was never really hypoxemic and is currently satting 98% on RA. The patient denies SOB at rest, but notes that he cannot walk from weakness. He denies chest pain, or palpitations, but does note abdominal pain and distention.  The patient is a retired bookbinder and denies any hx of inhalational exposures and denies any asbestos exposure that he is aware of.     REVIEW OF SYSTEMS:  A 12 point ROS was reviewed with the patient and was negative except for what is mentioned above.     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 1000 milliLiter(s) IV Continuous <Continuous>      Allergies    No Known Allergies    Intolerances        Vital Signs Last 24 Hrs  T(C): 36.3 (08 Jul 2020 08:50), Max: 37.4 (07 Jul 2020 14:50)  T(F): 97.4 (08 Jul 2020 08:50), Max: 99.3 (07 Jul 2020 14:50)  HR: 82 (08 Jul 2020 08:20) (66 - 98)  BP: 105/57 (08 Jul 2020 08:20) (91/52 - 115/72)  BP(mean): 76 (08 Jul 2020 08:20) (67 - 88)  RR: 22 (08 Jul 2020 08:20) (16 - 26)  SpO2: 92% (08 Jul 2020 08:20) (92% - 98%)    07-07 @ 07:01  -  07-08 @ 07:00  --------------------------------------------------------  IN: 1607 mL / OUT: 700 mL / NET: 907 mL          PHYSICAL EXAM:  Constitutional: WDWN, thin and frail, temporal wasting   Head: NC/AT  EENT: PERRL, anicteric sclera; oropharynx clear, MMM  Neck: supple, no appreciable JVD  Respiratory: Diminished bases bilaterally; no W/R/R, good respiratory effort  Cardiovascular: +S1/S2, RRR  Gastrointestinal: Large abdominal distention, +BSx4  Extremities: WWP; no edema, clubbing or cyanosis  Vascular: 2+ radial, DP/PT pulses B/L  Neurological: AAOx3; no focal deficits    LABS:      CBC Full  -  ( 08 Jul 2020 07:12 )  WBC Count : 8.39 K/uL  RBC Count : 2.70 M/uL  Hemoglobin : 7.3 g/dL  Hematocrit : 22.6 %  Platelet Count - Automated : 162 K/uL  Mean Cell Volume : 83.7 fl  Mean Cell Hemoglobin : 27.0 pg  Mean Cell Hemoglobin Concentration : 32.3 gm/dL  Auto Neutrophil # : x  Auto Lymphocyte # : x  Auto Monocyte # : x  Auto Eosinophil # : x  Auto Basophil # : x  Auto Neutrophil % : x  Auto Lymphocyte % : x  Auto Monocyte % : x  Auto Eosinophil % : x  Auto Basophil % : x    07-08    138  |  104  |  20  ----------------------------<  94  3.9   |  23  |  0.71    Ca    7.7<L>      08 Jul 2020 07:12  Phos  2.7     07-08  Mg     1.8     07-08    TPro  5.8<L>  /  Alb  1.9<L>  /  TBili  0.4  /  DBili  x   /  AST  39  /  ALT  17  /  AlkPhos  121<H>  07-08    PT/INR - ( 07 Jul 2020 06:26 )   PT: 17.0 sec;   INR: 1.47          PTT - ( 08 Jul 2020 07:12 )  PTT:47.3 sec                  RADIOLOGY & ADDITIONAL STUDIES:  CTA chest

## 2020-07-08 NOTE — PROGRESS NOTE ADULT - PROBLEM SELECTOR PLAN 2
-s/p MRCP  -c/b ascites  -GI consult  -ask IR if they will do diagnostic tap while on heparin g++  -onc consult after diagnostic tap for recs -s/p MRCP  -c/b ascites  -GI consult  -IR for diagnostic tap tomorrow  -onc consult after diagnostic tap for recs -s/p MRCP  -c/b ascites  -consult GI  -IR for diagnostic tap tomorrow - hold heparin for 30 minutes beforehand  -onc consult after diagnostic tap for recs

## 2020-07-08 NOTE — PROGRESS NOTE ADULT - ASSESSMENT
79 yo male pmhx DM2 noninsulin dependent bibems for acute onset PE with finding of malignant pancreatic cancer on MRCP. 81 yo male pmhx DM2 noninsulin dependent bibems for acute onset PE with finding of malignant pancreatic cancer on MRCP.

## 2020-07-08 NOTE — PROGRESS NOTE ADULT - ATTENDING COMMENTS
Pt hemodynamically stable without RV strain on ECHO. would continue anticoag with Heparin for PE while await planned procedures for further w/u of possible malignancy.

## 2020-07-08 NOTE — PROGRESS NOTE ADULT - PROBLEM SELECTOR PLAN 1
- b/l extensive PE, confirmed w/ CT  -echo: no evidence of RHS  -PTT goal- 58-99  -PTT subtherapeutic, Heparin drip increased to 17 mL/hr  -f/u PTTs q6hrs  -discuss risks and benefits of SVC filter vs heparin - possibly start long-term NOAC when done receiving procedures - b/l extensive PE, confirmed w/ CT  - echo: no evidence of RHS  - currently on 17 mL/hr Heparin drip; PTT goal- 58-99  - f/u with 22:00 and 06:00 (7/9) PTT labs; continue PTTs q6hrs  - consider starting long-term NOAC when pt is done receiving procedures

## 2020-07-08 NOTE — PROGRESS NOTE ADULT - ATTENDING COMMENTS
81 yo man with DM2 admitted to telemetry initially for acute weakness in the setting of 3 months of unexpected weight loss (20 lbs), decreased energy, and abdominal distension. CT on arrival showed bilateral pulmonary emboli (now on heparin gtt), pancreatic lesions, moderate ascites (investigated further with MRCP -> lesions seen in pancreatic head and tail, high likelihood of malignant IPMN). Gastroenterology following.     # Pulmonary Embolism   Continue Heparin gtt if GI still considering EUS with FNA of pancreatic lesion   Transition to NOAC when no further instrumentation planned. Consider apixaban ([place] Trial : Apixaban non-inferior to LMWH for treatment of VTE in malignancy; April 2020; DOI:10.1056/[id number])    # IPMN   Scheduled for diagnostic paracentesis tomorrow with IR – f/u cytology    Re: Patient’s understanding of imaging results and their implications  -	Housestaff and attending spoke with him together to probe his understanding of his imaging results. At first, patient was evasive, and attempted to redirect the conversation toward his annoyance at the number of studies done. However, when asked if he understood that he may have a malignancy, he said “I know… I’m old, and everybody gotta go sometime”    # Dispo planning   PT eval. 79 yo man with DM2 admitted to telemetry initially for acute weakness in the setting of 3 months of unexpected weight loss (20 lbs), decreased energy, and abdominal distension. CT on arrival showed bilateral pulmonary emboli (now on heparin gtt), pancreatic lesions, moderate ascites (investigated further with MRCP -> lesions seen in pancreatic head and tail, high likelihood of malignant IPMN). Gastroenterology following.     # Pulmonary Embolism   Continue Heparin gtt if GI still considering EUS with FNA of pancreatic lesion   Transition to NOAC when no further instrumentation planned. Consider apixaban ([place] Trial : Apixaban non-inferior to LMWH for treatment of VTE in malignancy; April 2020; DOI:10.1056/[id number])    # IPMN   Scheduled for diagnostic paracentesis tomorrow with IR – f/u cytology    Re: Patient’s understanding of imaging results and their implications  -	Housestaff and attending spoke with him together to probe his understanding of his imaging results. At first, patient was evasive, and attempted to redirect the conversation toward his annoyance at the number of studies done. 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   - An Asymptomatic atrial septal aneurysm (often associated with a patent foramen ovale) typically does not require further management or treatment. In a patient with known VTEs, and high risk of future VTEs, the risk of stroke from paradoxical emboli may be higher. However, considering that our patient will require long term anticoagulation reagardless (AC is the treatment for cryptogenic stroke with an isolated atrial septal aneurysm), searching for a PFO with a more sensitive test with the intention of closing the hypothetical defect if found, may not be worthwhile. In addition, given his advanced age, possible malignancy, and indeterminate prognosis, would favor avoiding invasive procedures for this incidental finding as long as it is consistent with his wishes.     # Dispo planning   PT eval.

## 2020-07-08 NOTE — PROGRESS NOTE ADULT - PROBLEM SELECTOR PLAN 5
F: LR 8cc/hr  E: replete k<4 mg<2  N: Carb controlled diet    PT consult - experiencing generalized weakness for months now; possibly due to underlying malignancy   - consult PT

## 2020-07-08 NOTE — PROGRESS NOTE ADULT - SUBJECTIVE AND OBJECTIVE BOX
Incomplete Note*   TRANSFER NOTE       Hospital Course: 81 yo male pmhx DM2 noninsulin dependent bibems for acute onset profound weakness. Pt states he could not support himself last night and guided himself to the floor from which he could not stand up. He states he was on floor approximately 6-7 hours and was finally able to crawl to phone and call 911. Pt also endorses a back pain for 2 months that progressed to a back and stomach pain (does not endorse radiation). Simultaneously he has also noticed that he has decreased energy. Over the course of 3months 20lb weight loss with no change in appetite He denies cp/fever/cough/urinary incontinence/fever/chills. Pt endorses decreased appetite over past 2 months and abdominal distension x 2 months. Denies cp/soa. Denies black/bloody stools. CT showed b/l PEs for which he was started on heparin g++. 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 IR consulted for diagnostic paracentesis 7/9. Echo showed no evidence of right heart strain. Monitored Hb, stool guiac negative. Heparin being adjusted to therapeutic level.      Subjective/ROS: Denies HA, CP, SOB, n/v, changes in bowel/urinary habits.  12pt ROS otherwise negative.    Family History, Social History, Past Medical History, and Home Medications from admission H&P were reviewed and are unchanged unless noted below.       VITALS  Vital Signs Last 24 Hrs  T(C): 36.2 (08 Jul 2020 14:09), Max: 37.1 (08 Jul 2020 05:28)  T(F): 97.1 (08 Jul 2020 14:09), Max: 98.8 (08 Jul 2020 05:28)  HR: 97 (08 Jul 2020 13:10) (68 - 97)  BP: 113/65 (08 Jul 2020 13:10) (95/59 - 115/72)  BP(mean): 84 (08 Jul 2020 13:10) (72 - 88)  RR: 23 (08 Jul 2020 13:10) (18 - 26)  SpO2: 90% (08 Jul 2020 13:10) (90% - 98%)    CAPILLARY BLOOD GLUCOSE      POCT Blood Glucose.: 134 mg/dL (08 Jul 2020 11:27)  POCT Blood Glucose.: 109 mg/dL (08 Jul 2020 06:46)      PHYSICAL EXAM  General: A&Ox3; NAD  Head: NC/AT; MMM; PERRL; EOMI;  Neck: Supple; no JVD  Respiratory: CTA B/L; no wheezes/crackles   Cardiovascular: Regular rhythm/rate; S1/S2   Gastrointestinal: Soft; NTND; normoactive BS  Extremities: WWP; no edema/cyanosis  Neurological:  CNII-XII grossly intact; no obvious focal deficits    MEDICATIONS  (STANDING):  heparin  Infusion 1400 Unit(s)/Hr (17 mL/Hr) IV Continuous <Continuous>  insulin lispro (HumaLOG) corrective regimen sliding scale   SubCutaneous Before meals and at bedtime  senna 2 Tablet(s) Oral at bedtime    MEDICATIONS  (PRN):      No Known Allergies      LABS                        8.0    9.39  )-----------( 159      ( 08 Jul 2020 15:12 )             25.8     07-08    138  |  104  |  20  ----------------------------<  94  3.9   |  23  |  0.71    Ca    7.7<L>      08 Jul 2020 07:12  Phos  2.7     07-08  Mg     1.8     07-08    TPro  5.8<L>  /  Alb  1.9<L>  /  TBili  0.4  /  DBili  x   /  AST  39  /  ALT  17  /  AlkPhos  121<H>  07-08    PT/INR - ( 07 Jul 2020 06:26 )   PT: 17.0 sec;   INR: 1.47          PTT - ( 08 Jul 2020 15:12 )  PTT:57.4 sec    CARDIAC MARKERS ( 07 Jul 2020 11:57 )  0.030 ng/mL / x     / x     / x     / x      CARDIAC MARKERS ( 07 Jul 2020 06:26 )  x     / x     / 415 U/L / x     / x            IMAGING/EKG/ETC  EKG:  Xray:  CT:  MRI: TRANSFER NOTE   [facility]     Hospital Course: 79 yo male pmhx DM2 noninsulin dependent bibems for acute onset profound weakness. Pt states he could not support himself last night and guided himself to the floor from which he could not stand up. He states he was on floor approximately 6-7 hours and was finally able to crawl to phone and call 911. Pt also endorses a back pain for 2 months that progressed to a back and stomach pain (does not endorse radiation). Simultaneously he has also noticed that he has decreased energy. Over the course of 3months 20lb weight loss with no change in appetite He denies cp/fever/cough/urinary incontinence/fever/chills. Pt endorses decreased appetite over past 2 months and abdominal distension x 2 months. Denies cp/soa. Denies black/bloody stools. CT showed b/l PEs for which he was started on heparin g++. 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 IR consulted for diagnostic paracentesis 7/9. Echo showed no evidence of right heart strain. Monitored Hb, stool guiac negative. Heparin being adjusted to therapeutic level.      Subjective/ROS: Pt is currently doing well with no acute complaints. Denies HA, CP, SOB, n/v, changes in bowel/urinary habits.  12pt ROS otherwise negative.    Family History, Social History, Past Medical History, and Home Medications from admission H&P were reviewed and are unchanged unless noted below.       VITALS  Vital Signs Last 24 Hrs  T(C): 36.2 (08 Jul 2020 14:09), Max: 37.1 (08 Jul 2020 05:28)  T(F): 97.1 (08 Jul 2020 14:09), Max: 98.8 (08 Jul 2020 05:28)  HR: 97 (08 Jul 2020 13:10) (68 - 97)  BP: 113/65 (08 Jul 2020 13:10) (95/59 - 115/72)  BP(mean): 84 (08 Jul 2020 13:10) (72 - 88)  RR: 23 (08 Jul 2020 13:10) (18 - 26)  SpO2: 90% (08 Jul 2020 13:10) (90% - 98%)    CAPILLARY BLOOD GLUCOSE      POCT Blood Glucose.: 134 mg/dL (08 Jul 2020 11:27)  POCT Blood Glucose.: 109 mg/dL (08 Jul 2020 06:46)      PHYSICAL EXAM  General: A&Ox3; NAD, frail-appearing   Head: NC/AT  Neck: Supple; no JVD  Respiratory: CTA B/L; no wheezes/crackles   Cardiovascular: Regular rhythm/rate; S1/S2   Gastrointestinal: Soft, distended but nontender, +fluid wave, normoactive BS.  Extremities: WWP; no edema/cyanosis  Neurological: no obvious focal deficits    MEDICATIONS  (STANDING):  heparin  Infusion 1400 Unit(s)/Hr (17 mL/Hr) IV Continuous <Continuous>  insulin lispro (HumaLOG) corrective regimen sliding scale   SubCutaneous Before meals and at bedtime  senna 2 Tablet(s) Oral at bedtime    MEDICATIONS  (PRN):      No Known Allergies      LABS                        8.0    9.39  )-----------( 159      ( 08 Jul 2020 15:12 )             25.8     07-08    138  |  104  |  20  ----------------------------<  94  3.9   |  23  |  0.71    Ca    7.7<L>      08 Jul 2020 07:12  Phos  2.7     07-08  Mg     1.8     07-08    TPro  5.8<L>  /  Alb  1.9<L>  /  TBili  0.4  /  DBili  x   /  AST  39  /  ALT  17  /  AlkPhos  121<H>  07-08    PT/INR - ( 07 Jul 2020 06:26 )   PT: 17.0 sec;   INR: 1.47          PTT - ( 08 Jul 2020 15:12 )  PTT:57.4 sec    CARDIAC MARKERS ( 07 Jul 2020 11:57 )  0.030 ng/mL / x     / x     / x     / x      CARDIAC MARKERS ( 07 Jul 2020 06:26 )  x     / x     / 415 U/L / x     / x              EKG: sinus rhythm, no acute changes.     Chest Xray:  IMPRESSION:  Prominence of the pulmonary arteries and mild pulmonary hypertension cannot excluded.  No consolidation or pleural effusions.    CT Angio Chest PE Protocol w/ IV Cont   IMPRESSION: Extensive acute bilateral PE.    CT Abdomen and Pelvis w/ Oral Cont   1. Numerous bilateral pulmonary emboli with bilateral small pleural effusions . See CTPE from same day for additional information.  2. Replacement of normal pancreatic parenchyma with innumerable cystic lesions with simple fluid density. There appears to be a part solid component measuring 4.2 cm in the pancreatic tail. Could represent malignant IPMN . Recommend endoscopic ultrasound/FNA for further evaluation as well as MR/MRCP.  3. Moderate ascites. Parietal peritoneal thickening.    MRCP:  Intra and extrahepatic biliary dilatation. No cholelithiasis or  choledocholithiasis.The pancreas is replaced virtually entirely by cystic lesions. A few of the pancreatic lesions show restricted diffusion on DWI imaging and ADC map in relation to the pancreatic head and pancreatic tail. Also evidence of subtle enhancement of these lesions in the pancreatic head and tail. 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 Moderate ascites. Recommend correlation with endoscopic ultrasound with FNA of the pancreatic head and tail regions.    Radiology & Additional Tests Reviewed.

## 2020-07-09 DIAGNOSIS — R93.5 ABNORMAL FINDINGS ON DIAGNOSTIC IMAGING OF OTHER ABDOMINAL REGIONS, INCLUDING RETROPERITONEUM: ICD-10-CM

## 2020-07-09 DIAGNOSIS — R18.8 OTHER ASCITES: ICD-10-CM

## 2020-07-09 DIAGNOSIS — E43 UNSPECIFIED SEVERE PROTEIN-CALORIE MALNUTRITION: ICD-10-CM

## 2020-07-09 DIAGNOSIS — J90 PLEURAL EFFUSION, NOT ELSEWHERE CLASSIFIED: ICD-10-CM

## 2020-07-09 DIAGNOSIS — I26.99 OTHER PULMONARY EMBOLISM WITHOUT ACUTE COR PULMONALE: ICD-10-CM

## 2020-07-09 LAB
ALBUMIN FLD-MCNC: 1.5 G/DL — SIGNIFICANT CHANGE UP
ALBUMIN SERPL ELPH-MCNC: 2 G/DL — LOW (ref 3.3–5)
ALP SERPL-CCNC: 172 U/L — HIGH (ref 40–120)
ALT FLD-CCNC: 16 U/L — SIGNIFICANT CHANGE UP (ref 10–45)
ANION GAP SERPL CALC-SCNC: 12 MMOL/L — SIGNIFICANT CHANGE UP (ref 5–17)
APTT BLD: 24 SEC — LOW (ref 27.5–35.5)
APTT BLD: 54.8 SEC — HIGH (ref 27.5–35.5)
APTT BLD: 62 SEC — HIGH (ref 27.5–35.5)
AST SERPL-CCNC: 30 U/L — SIGNIFICANT CHANGE UP (ref 10–40)
B PERT IGG+IGM PNL SER: SIGNIFICANT CHANGE UP
BILIRUB SERPL-MCNC: 0.4 MG/DL — SIGNIFICANT CHANGE UP (ref 0.2–1.2)
BUN SERPL-MCNC: 16 MG/DL — SIGNIFICANT CHANGE UP (ref 7–23)
CALCIUM SERPL-MCNC: 8 MG/DL — LOW (ref 8.4–10.5)
CHLORIDE SERPL-SCNC: 104 MMOL/L — SIGNIFICANT CHANGE UP (ref 96–108)
CO2 SERPL-SCNC: 22 MMOL/L — SIGNIFICANT CHANGE UP (ref 22–31)
COLOR FLD: YELLOW — SIGNIFICANT CHANGE UP
CREAT SERPL-MCNC: 0.6 MG/DL — SIGNIFICANT CHANGE UP (ref 0.5–1.3)
FLUID INTAKE SUBSTANCE CLASS: SIGNIFICANT CHANGE UP
FLUID SEGMENTED GRANULOCYTES: 38 % — SIGNIFICANT CHANGE UP
GLUCOSE BLDC GLUCOMTR-MCNC: 133 MG/DL — HIGH (ref 70–99)
GLUCOSE BLDC GLUCOMTR-MCNC: 154 MG/DL — HIGH (ref 70–99)
GLUCOSE BLDC GLUCOMTR-MCNC: 193 MG/DL — HIGH (ref 70–99)
GLUCOSE BLDC GLUCOMTR-MCNC: 254 MG/DL — HIGH (ref 70–99)
GLUCOSE FLD-MCNC: 130 MG/DL — SIGNIFICANT CHANGE UP
GLUCOSE SERPL-MCNC: 102 MG/DL — HIGH (ref 70–99)
HCT VFR BLD CALC: 25.6 % — LOW (ref 39–50)
HGB BLD-MCNC: 8.1 G/DL — LOW (ref 13–17)
LYMPHOCYTES # FLD: 52 % — SIGNIFICANT CHANGE UP
MAGNESIUM SERPL-MCNC: 2.1 MG/DL — SIGNIFICANT CHANGE UP (ref 1.6–2.6)
MCHC RBC-ENTMCNC: 26.2 PG — LOW (ref 27–34)
MCHC RBC-ENTMCNC: 31.6 GM/DL — LOW (ref 32–36)
MCV RBC AUTO: 82.8 FL — SIGNIFICANT CHANGE UP (ref 80–100)
MONOS+MACROS # FLD: 10 % — SIGNIFICANT CHANGE UP
NRBC # BLD: 0 /100 WBCS — SIGNIFICANT CHANGE UP (ref 0–0)
PHOSPHATE SERPL-MCNC: 2.5 MG/DL — SIGNIFICANT CHANGE UP (ref 2.5–4.5)
PLATELET # BLD AUTO: 176 K/UL — SIGNIFICANT CHANGE UP (ref 150–400)
POTASSIUM SERPL-MCNC: 3.7 MMOL/L — SIGNIFICANT CHANGE UP (ref 3.5–5.3)
POTASSIUM SERPL-SCNC: 3.7 MMOL/L — SIGNIFICANT CHANGE UP (ref 3.5–5.3)
PROT FLD-MCNC: 3.8 G/DL — SIGNIFICANT CHANGE UP
PROT SERPL-MCNC: 6.2 G/DL — SIGNIFICANT CHANGE UP (ref 6–8.3)
RBC # BLD: 3.09 M/UL — LOW (ref 4.2–5.8)
RBC # FLD: 14.1 % — SIGNIFICANT CHANGE UP (ref 10.3–14.5)
RCV VOL RI: 1000 /UL — HIGH (ref 0–0)
SODIUM SERPL-SCNC: 138 MMOL/L — SIGNIFICANT CHANGE UP (ref 135–145)
SPECIMEN SOURCE FLD: SIGNIFICANT CHANGE UP
TOTAL NUCLEATED CELL COUNT, BODY FLUID: 1375 /UL — SIGNIFICANT CHANGE UP
TUBE TYPE: SIGNIFICANT CHANGE UP
WBC # BLD: 8.6 K/UL — SIGNIFICANT CHANGE UP (ref 3.8–10.5)
WBC # FLD AUTO: 8.6 K/UL — SIGNIFICANT CHANGE UP (ref 3.8–10.5)

## 2020-07-09 PROCEDURE — 99232 SBSQ HOSP IP/OBS MODERATE 35: CPT | Mod: GC

## 2020-07-09 PROCEDURE — 88342 IMHCHEM/IMCYTCHM 1ST ANTB: CPT | Mod: 26,59

## 2020-07-09 PROCEDURE — 93970 EXTREMITY STUDY: CPT | Mod: 26

## 2020-07-09 PROCEDURE — 99233 SBSQ HOSP IP/OBS HIGH 50: CPT | Mod: GC

## 2020-07-09 PROCEDURE — 88344 IMHCHEM/IMCYTCHM EA MLT ANTB: CPT | Mod: 26,59

## 2020-07-09 PROCEDURE — 88112 CYTOPATH CELL ENHANCE TECH: CPT | Mod: 26

## 2020-07-09 PROCEDURE — 99358 PROLONG SERVICE W/O CONTACT: CPT | Mod: 25

## 2020-07-09 PROCEDURE — 88341 IMHCHEM/IMCYTCHM EA ADD ANTB: CPT | Mod: 26,59

## 2020-07-09 PROCEDURE — 88305 TISSUE EXAM BY PATHOLOGIST: CPT | Mod: 26

## 2020-07-09 PROCEDURE — 49083 ABD PARACENTESIS W/IMAGING: CPT

## 2020-07-09 RX ORDER — HEPARIN SODIUM 5000 [USP'U]/ML
1900 INJECTION INTRAVENOUS; SUBCUTANEOUS
Qty: 25000 | Refills: 0 | Status: DISCONTINUED | OUTPATIENT
Start: 2020-07-09 | End: 2020-07-11

## 2020-07-09 RX ADMIN — HEPARIN SODIUM 1900 UNIT(S)/HR: 5000 INJECTION INTRAVENOUS; SUBCUTANEOUS at 14:23

## 2020-07-09 RX ADMIN — Medication 2: at 17:34

## 2020-07-09 RX ADMIN — Medication 6: at 22:04

## 2020-07-09 RX ADMIN — Medication 2: at 11:49

## 2020-07-09 NOTE — PROGRESS NOTE ADULT - SUBJECTIVE AND OBJECTIVE BOX
GASTROENTEROLOGY PROGRESS NOTE  Patient seen and examined at bedside. Patient reports feeling better. Understands he has an extensive clot burden, however, repeatedly states he does not want an invasive procedure at this time.    PERTINENT REVIEW OF SYSTEMS:  CONSTITUTIONAL: No weakness, fevers or chills  HEENT: No visual changes; No vertigo or throat pain   GASTROINTESTINAL: No abdominal or epigastric pain. No nausea, vomiting, or hematemesis; No diarrhea or constipation. No melena or hematochezia.  NEUROLOGICAL: No numbness or weakness  SKIN: No itching, burning, rashes, or lesions     Allergies    No Known Allergies    Intolerances      MEDICATIONS:  MEDICATIONS  (STANDING):  heparin  Infusion. 1900 Unit(s)/Hr (19 mL/Hr) IV Continuous <Continuous>  insulin lispro (HumaLOG) corrective regimen sliding scale   SubCutaneous Before meals and at bedtime  polyethylene glycol 3350 17 Gram(s) Oral every 24 hours  senna 2 Tablet(s) Oral at bedtime    MEDICATIONS  (PRN):    Vital Signs Last 24 Hrs  T(C): 37.1 (09 Jul 2020 09:44), Max: 37.3 (08 Jul 2020 21:07)  T(F): 98.7 (09 Jul 2020 09:44), Max: 99.2 (08 Jul 2020 21:07)  HR: 76 (09 Jul 2020 09:44) (76 - 97)  BP: 108/53 (09 Jul 2020 09:44) (103/61 - 122/69)  BP(mean): 84 (08 Jul 2020 13:10) (84 - 84)  RR: 20 (09 Jul 2020 09:44) (20 - 23)  SpO2: 92% (09 Jul 2020 09:44) (90% - 92%)    07-08 @ 07:01  -  07-09 @ 07:00  --------------------------------------------------------  IN: 685 mL / OUT: 800 mL / NET: -115 mL      PHYSICAL EXAM:    General: Thin, frail  HEENT: MMM, conjunctiva and sclera clear  Gastrointestinal: Soft non-tender non-distended; Normal bowel sounds; No hepatosplenomegaly. No rebound or guarding  Skin: Warm and dry. No obvious rash    LABS:                        8.0    9.39  )-----------( 159      ( 08 Jul 2020 15:12 )             25.8     07-08    138  |  104  |  20  ----------------------------<  94  3.9   |  23  |  0.71    Ca    7.7<L>      08 Jul 2020 07:12  Phos  2.7     07-08  Mg     1.8     07-08    TPro  5.8<L>  /  Alb  1.9<L>  /  TBili  0.4  /  DBili  x   /  AST  39  /  ALT  17  /  AlkPhos  121<H>  07-08    PTT - ( 09 Jul 2020 01:47 )  PTT:54.8 sec                  RADIOLOGY & ADDITIONAL STUDIES:  Reviewed

## 2020-07-09 NOTE — CHART NOTE - NSCHARTNOTEFT_GEN_A_CORE
PALLIATIVE MEDICINE COORDINATION OF CARE NOTE FOR SERGEY LUTHER  [   ] ED Trigger   [  ] MICU Trigger     [ X ] Consult     30 Minutes; Start: 11:20am End: 11:50am, of non-face-to-face prolonged service provided that relates to (face-to-face) care that has or will occur and ongoing patient management, including one or more of the following:   - Reviewed records from other physicians or other health care professional services, including one or more of the following: other medical records and diagnostic / radiology study results     HPI:  79 yo male pmhx DM2 noninsulin dependent bibems for acute onset profound weakness. Pt states he could not support himself last night and guided himself to the floor from which he could not stand up. He states he was on floor approximately 6-7 hours and was finally able to crawl to phone and call 911. Pt also endorses a back pain for 2 months that progressed to a back and stomach pain (does not endorse radiation). Simultaneously he has also noticed that he has decreased energy. Over the course of 3months 20lb weight loss with no change in appetite He denies cp/fever/cough/urinary incontinence/fever/chills. Pt endorses decreased appetite over past 2 months and abdominal distension x 2 months. Denies cp/soa. Denies black/bloody stools. (07 Jul 2020 13:53)    - Other: [facility] reviewed.    No Rx found on [facility] review. Ref #: [id number]   OPIOID NAIVE    - Other: Medication reviewed.    The patient HAS used PRN's in the last 24h. Patient received 1 dose of Senna in the last 24h. MME: 0mg    MEDICATIONS  (STANDING):  heparin  Infusion. 1900 Unit(s)/Hr (19 mL/Hr) IV Continuous <Continuous>  insulin lispro (HumaLOG) corrective regimen sliding scale   Sub Cutaneous Before meals and at bedtime  polyethylene glycol 3350 17 Gram(s) Oral every 24 hours  senna 2 Tablet(s) Oral at bedtime  MEDICATIONS  (PRN):    - Other: Advanced directives     Full Code     No documented MOLST found on [facility]     No documented HCP form found on [facility]     Other surrogates: Brother? Sergey [phone]     No Living will / POA / Advance directives found on [place] / [facility].     No documented GOC notes on [facility]    - Other: Coordination/Plan of care     1 admissions in 1 year     Current admission LOS: 2 days     LACE score: 9     Patient NOT previously seen by palliative medicine consult service.      Consult request for: " MRCP suspects possible malignant form of IPMN-intraductal papillary neoplasm. Pending diagnostic thoracentesis by IR  to rule out malignancy. These medical concerns are new to the patient; want to further assess patient's goals of care."    Patient's primary team will need to complete GOC document of any prior GOC discussions that were done during this admission so far as well as information available for Proxy/surrogates/NOK/guardians prior to a palliative contact.    Full consult to follow within 24h.

## 2020-07-09 NOTE — PROGRESS NOTE ADULT - SUBJECTIVE AND OBJECTIVE BOX
Patient is a 80y old  Male who presents with a chief complaint of Pulmonary embolism (09 Jul 2020 13:41)      INTERVAL HPI/OVERNIGHT EVENTS:    Pt. seen and examined this morning  Pt. c/o generalized weakness  Denies abdominal pain, F/C, CP, SOB, N/V    Review of Systems: 12 point review of systems otherwise negative    MEDICATIONS  (STANDING):  heparin  Infusion. 1900 Unit(s)/Hr (19 mL/Hr) IV Continuous <Continuous>  insulin lispro (HumaLOG) corrective regimen sliding scale   SubCutaneous Before meals and at bedtime  polyethylene glycol 3350 17 Gram(s) Oral every 24 hours  senna 2 Tablet(s) Oral at bedtime    MEDICATIONS  (PRN):      Allergies    No Known Allergies    Intolerances          Vital Signs Last 24 Hrs  T(C): 36.7 (09 Jul 2020 16:04), Max: 37.3 (08 Jul 2020 21:07)  T(F): 98 (09 Jul 2020 16:04), Max: 99.2 (08 Jul 2020 21:07)  HR: 75 (09 Jul 2020 16:04) (75 - 86)  BP: 110/68 (09 Jul 2020 16:04) (103/61 - 122/69)  BP(mean): --  RR: 19 (09 Jul 2020 16:04) (19 - 20)  SpO2: 94% (09 Jul 2020 16:04) (92% - 94%)  CAPILLARY BLOOD GLUCOSE      POCT Blood Glucose.: 154 mg/dL (09 Jul 2020 11:34)  POCT Blood Glucose.: 133 mg/dL (09 Jul 2020 08:52)  POCT Blood Glucose.: 127 mg/dL (08 Jul 2020 22:42)  POCT Blood Glucose.: 172 mg/dL (08 Jul 2020 17:50)      07-08 @ 07:01  -  07-09 @ 07:00  --------------------------------------------------------  IN: 685 mL / OUT: 800 mL / NET: -115 mL        Physical Exam:  (this morning)  Daily     Daily   General:  comfortable-appearing in NAD, +cachectic  HEENT:  MMM  Abdomen:  +distended/fluid wave per housestaff exam  Extremities:  no edema B/L LE  Skin:  WWP  Neuro:  AAOx3    LABS:                        8.1    8.60  )-----------( 176      ( 09 Jul 2020 14:35 )             25.6     07-09    138  |  104  |  16  ----------------------------<  102<H>  3.7   |  22  |  0.60    Ca    8.0<L>      09 Jul 2020 14:46  Phos  2.5     07-09  Mg     2.1     07-09    TPro  6.2  /  Alb  2.0<L>  /  TBili  0.4  /  DBili  x   /  AST  30  /  ALT  16  /  AlkPhos  172<H>  07-09    PTT - ( 09 Jul 2020 14:35 )  PTT:24.0 sec        RADIOLOGY & ADDITIONAL TESTS:    ---------------------------------------------------------------------------  I personally reviewed: [  ]EKG   [  ]CXR    [  ] CT    [  ]Other  ---------------------------------------------------------------------------  PLEASE CHECK WHEN PRESENT:     [  ]Heart Failure     [  ] Acute     [  ] Acute on Chronic     [  ] Chronic  -------------------------------------------------------------------     [  ]Diastolic [HFpEF]     [  ]Systolic [HFrEF]     [  ]Combined [HFpEF & HFrEF]     [  ]Other:  -------------------------------------------------------------------  [  ]BEKAH     [  ]ATN     [  ]Reneal Medullary Necrosis     [  ]Renal Cortical Necrosis     [  ]Other Pathological Lesions:    [  ]CKD 1  [  ]CKD 2  [  ]CKD 3  [  ]CKD 4  [  ]CKD 5  [  ]Other  -------------------------------------------------------------------  [  ]Other/Unspecified:    --------------------------------------------------------------------    Abdominal Nutritional Status  [  ]Malnutrition: See Nutrition Note  [  ]Cachexia  [  ]Other:   [  ]Supplement Ordered:  [  ]Morbid Obesity (BMI >=40]

## 2020-07-09 NOTE — PROGRESS NOTE ADULT - PROBLEM SELECTOR PLAN 2
- s/p MRCP; GI following - recommended EUS w/FNA as outpatient to obtain diagnosis,  depending on patient's wishes.   - Diagnostic paracentesis done today (7/9) with IR - follow up with fluid analysis.   - Oncology consulted, will f/u on recs after cytology results.   - Palliative on board. - s/p MRCP; GI following - recommended EUS w/ FNA as outpatient to obtain diagnosis, depending on patient's wishes.   - Diagnostic paracentesis done today (7/9) with IR - follow up with fluid analysis.   - Oncology consulted, will f/u on recs after cytology results.   - Palliative on board to discuss goals of care.

## 2020-07-09 NOTE — PROGRESS NOTE ADULT - PROBLEM SELECTOR PLAN 3
MRCP findings concerning for malignant form of IPMN-intraductal papillary mucinous neoplasm; GI, Heme-Onc, and Palliative Care following; possible plan for EUS, pending further GOC discussions

## 2020-07-09 NOTE — PROGRESS NOTE ADULT - PROBLEM SELECTOR PLAN 6
- Trace effusions on CT scan with adjacent atelectasis - likely related to abdominal ascites. As per pulm team, effusions are too small for intervention and are not causing the patient any symptoms.   - Will continue to observe for now.

## 2020-07-09 NOTE — PROGRESS NOTE ADULT - PROBLEM SELECTOR PLAN 3
- Likely related to malignancy; pt hasn't had any signs of blood loss. Continue trending H/H; transfuse if <7.

## 2020-07-09 NOTE — PROGRESS NOTE ADULT - PROBLEM SELECTOR PLAN 5
- Experiencing generalized weakness for months now; possibly due to underlying malignancy.   - Consult PT.   - Consider CRISTINA upon discharge. - Experiencing generalized weakness for months now; possibly due to underlying malignancy.  - Consult PT for further evaluation.  - Consider CRISTINA upon discharge.

## 2020-07-09 NOTE — PROGRESS NOTE ADULT - PROBLEM SELECTOR PLAN 1
- B/l extensive PE, confirmed w/ CT. No evidence of RHS on echo.  - PESI score of 120, has an overall high risk of mortility and morbidity.   - LE Doppler: bilateral DVTs found in multiple vessels.  - Continue Heparin drip, currently on 19 mL/hr Heparin drip; change accordingly to reach PTT goal- 58-99. continue PTTs q6hrs. Will switch to long-term NOAC once confirmed that pt won't need any further procedures. - B/l extensive PE, confirmed w/ CT. No evidence of RHS on echo.  - PESI score of 120, has an overall high risk of mortality and morbidity.   - US Duplex: R leg - Acute DVT involving the posterior tibial and peroneal veins. L leg - Acute DVT in the popliteal vein and peroneal veins.  - Continue Heparin drip, currently on 19 mL/hr Heparin drip; change accordingly to reach PTT goal- 58-99. Heparin drip interrupted by paracentesis which was preformed today; restarted the drip @2:30, f/u PTT at 22:00. Continue PTTs q6hrs. Will switch to long-term NOAC once confirmed that pt won't need any further procedures - may get EUS w/ FNA with GI.

## 2020-07-09 NOTE — PROGRESS NOTE ADULT - SUBJECTIVE AND OBJECTIVE BOX
PULMONARY CONSULT SERVICE FOLLOW-UP NOTE    INTERVAL HPI:  Reviewed chart and overnight events; patient seen and examined at bedside. The patient has no pulmonary complaints this morning, he is not sure at this point how far he would like to go in terms of invasive procedures. He feels that he is at the end of his life and is not sure how aggressive he wants to be.     MEDICATIONS:  Pulmonary:    Antimicrobials:    Anticoagulants:  heparin  Infusion. 1900 Unit(s)/Hr IV Continuous <Continuous>    Cardiac:      Allergies    No Known Allergies    Intolerances        Vital Signs Last 24 Hrs  T(C): 37.1 (09 Jul 2020 09:44), Max: 37.3 (08 Jul 2020 21:07)  T(F): 98.7 (09 Jul 2020 09:44), Max: 99.2 (08 Jul 2020 21:07)  HR: 76 (09 Jul 2020 09:44) (76 - 97)  BP: 108/53 (09 Jul 2020 09:44) (103/61 - 122/69)  BP(mean): 84 (08 Jul 2020 13:10) (84 - 84)  RR: 20 (09 Jul 2020 09:44) (20 - 23)  SpO2: 92% (09 Jul 2020 09:44) (90% - 92%)    07-08 @ 07:01  -  07-09 @ 07:00  --------------------------------------------------------  IN: 685 mL / OUT: 800 mL / NET: -115 mL        PHYSICAL EXAM:  Constitutional: WDWN, thin and frail, temporal wasting   Head: NC/AT  EENT: PERRL, anicteric sclera; oropharynx clear, MMM  Neck: supple, no appreciable JVD  Respiratory: Diminished bases bilaterally; no W/R/R, good respiratory effort  Cardiovascular: +S1/S2, RRR  Gastrointestinal: Large abdominal distention, +BSx4  Extremities: WWP; no edema, clubbing or cyanosis  Vascular: 2+ radial, DP/PT pulses B/L  Neurological: AAOx3; no focal deficits    LABS:        CBC Full  -  ( 08 Jul 2020 15:12 )  WBC Count : 9.39 K/uL  RBC Count : 3.03 M/uL  Hemoglobin : 8.0 g/dL  Hematocrit : 25.8 %  Platelet Count - Automated : 159 K/uL  Mean Cell Volume : 85.1 fl  Mean Cell Hemoglobin : 26.4 pg  Mean Cell Hemoglobin Concentration : 31.0 gm/dL  Auto Neutrophil # : 8.81 K/uL  Auto Lymphocyte # : 0.33 K/uL  Auto Monocyte # : 0.17 K/uL  Auto Eosinophil # : 0.00 K/uL  Auto Basophil # : 0.00 K/uL  Auto Neutrophil % : 79.8 %  Auto Lymphocyte % : 3.5 %  Auto Monocyte % : 1.8 %  Auto Eosinophil % : 0.0 %  Auto Basophil % : 0.0 %    07-08    138  |  104  |  20  ----------------------------<  94  3.9   |  23  |  0.71    Ca    7.7<L>      08 Jul 2020 07:12  Phos  2.7     07-08  Mg     1.8     07-08    TPro  5.8<L>  /  Alb  1.9<L>  /  TBili  0.4  /  DBili  x   /  AST  39  /  ALT  17  /  AlkPhos  121<H>  07-08    PTT - ( 09 Jul 2020 01:47 )  PTT:54.8 sec                  RADIOLOGY & ADDITIONAL STUDIES:  CTA chest

## 2020-07-09 NOTE — PROGRESS NOTE ADULT - PROBLEM SELECTOR PLAN 1
-Submassive PE likely related to underlying malignancy and significant immobility.   -PESI score 120 (age, sex, likely hx malignancy), Class IV/High risk (4-11.4%) 30 day mortality.   -No evidence of RHS on CT, PE's are extensive however and are seen in bilateral lobar and segmental branches of all lobes, dilated PA.   -Currently hemodynamically stable and satting well on RA and on a heparin gtt.   -Trops negative.     Recommend:  -Agree w/ heparin gtt, PTT sub therapeutic- would adjust.   -Pt can be transitioned to an oral agent such as a DOAC or Lovenox once no procedures planned.   -At this point LE dopplers seem to be of low utility, pt doing well on AC, even if there are DVT's noted there is no clear evidence that an IVC filter would be beneficial in this group of patients and thus it would not likely change current management.  -No role for catheter directed intervention.  -Pt can follow with the pulmonary fellows clinic, Please call EXT. [phone] to set up an appointment within 3 weeks of discharge.  -Please reconsult pulmonary for any questions

## 2020-07-09 NOTE — PROGRESS NOTE ADULT - PROBLEM SELECTOR PLAN 3
-A small plaque noted anteriorly on the right side, not calcified.   -The patient does not have any known hx of asbestos exposure but it may be possible that the he was exposed and did not know. He worked as a  in a factory for many years, unclear if this may have had some role.   -No intervention.

## 2020-07-09 NOTE — PROGRESS NOTE ADULT - PROBLEM SELECTOR PLAN 1
PESI Class 4; cont. A/C w/ heparin gtt for now; pending further GOC discussions, if no plan for further inpatient procedures, will change A/C to PO DOAC, likely longterm

## 2020-07-09 NOTE — PROGRESS NOTE ADULT - SUBJECTIVE AND OBJECTIVE BOX
OVERNIGHT EVENTS: Overnight, phlebotomist was unable to obtain 10 PM PTT lab. Night resident did a 1 am draw - PTT came back subtherapeutic at 54.8 and Heparin was subsequently changed to 1900 units/hr to reach therapeutic goal of 58-99.      SUBJECTIVE / INTERVAL HPI: Patient seen and examined at bedside. Patient is feeling overwhelmed and expresses frustration regarding his current situation. His weakness has improved since admission, although he believes he's not strong enough to go home with no assistance. He denies any chills, SOB, chest pain, abdominal pain, N/V/D.     VITAL SIGNS:  Vital Signs Last 24 Hrs  T(C): 37.1 (09 Jul 2020 09:44), Max: 37.3 (08 Jul 2020 21:07)  T(F): 98.7 (09 Jul 2020 09:44), Max: 99.2 (08 Jul 2020 21:07)  HR: 76 (09 Jul 2020 09:44) (76 - 86)  BP: 108/53 (09 Jul 2020 09:44) (103/61 - 122/69)  BP(mean): --  RR: 20 (09 Jul 2020 09:44) (20 - 20)  SpO2: 92% (09 Jul 2020 09:44) (92% - 92%)    PHYSICAL EXAM:    General: Frail, temporal wasting, nontoxic   HEENT: NC/AT; PERRL, anicteric sclera  Cardiovascular: +S1/S2, RRR, no murmurs, rubs, gallops  Respiratory: CTA B/L; no W/R/R  Gastrointestinal: soft, distended, nontender, +Fluid Wave  Extremities: no edema, clubbing or cyanosis  Neurological: AAOx3; no focal deficits    MEDICATIONS:  MEDICATIONS  (STANDING):  heparin  Infusion. 1900 Unit(s)/Hr (19 mL/Hr) IV Continuous <Continuous>  insulin lispro (HumaLOG) corrective regimen sliding scale   SubCutaneous Before meals and at bedtime  polyethylene glycol 3350 17 Gram(s) Oral every 24 hours  senna 2 Tablet(s) Oral at bedtime    MEDICATIONS  (PRN):      ALLERGIES:  Allergies    No Known Allergies    Intolerances        LABS:                        8.0    9.39  )-----------( 159      ( 08 Jul 2020 15:12 )             25.8     07-08    138  |  104  |  20  ----------------------------<  94  3.9   |  23  |  0.71    Ca    7.7<L>      08 Jul 2020 07:12  Phos  2.7     07-08  Mg     1.8     07-08    TPro  5.8<L>  /  Alb  1.9<L>  /  TBili  0.4  /  DBili  x   /  AST  39  /  ALT  17  /  AlkPhos  121<H>  07-08    PTT - ( 09 Jul 2020 01:47 )  PTT:54.8 sec    CAPILLARY BLOOD GLUCOSE      POCT Blood Glucose.: 154 mg/dL (09 Jul 2020 11:34)      RADIOLOGY & ADDITIONAL TESTS: Reviewed.

## 2020-07-09 NOTE — PROGRESS NOTE ADULT - ASSESSMENT
81yo M with DM2 BIBEMS for weakness, inability to ambulate, prompting him to call EMS found to have b/l PEs and abnormal pancreatic imaging on CT.    1. Abnormal imaging - Patient with an abdominal "fullness and heat" which radiates to the back, has been ongoing for ~1 month. Denies nausea, vomiting, fevers, chills, abdominal pain. Found to have b/l PE and innumerable thin, septated, cystic pancreatic lesions. Questionable solid lesion in the stomach with occlusion of the splenic vein. Reviewed CT and MRCP. 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 Question of malignant transformation which is possible, however, therapy would require total pancreatectomy if surgical candidate, or aggressive chemotherapy depending on functional status.  - Can consider EUS w/FNA to obtain diagnosis, however, patient currently deferring and ultimately unlikely to   - Obtain paracentesis with cytology to evaluate for evidence of metastatic disease  - If patient desires procedure can have performed as outpatient with Dr. Gomez  - Consider palliative care consultation    Recommendations discussed with primary team  Case discussed with attending physician  Please recall as needed with any gastroenterological questions  Please schedule patient for follow-up with Dr. Gomez at [phone]

## 2020-07-10 DIAGNOSIS — R53.81 OTHER MALAISE: ICD-10-CM

## 2020-07-10 DIAGNOSIS — Z51.5 ENCOUNTER FOR PALLIATIVE CARE: ICD-10-CM

## 2020-07-10 DIAGNOSIS — Z71.89 OTHER SPECIFIED COUNSELING: ICD-10-CM

## 2020-07-10 DIAGNOSIS — Z66 DO NOT RESUSCITATE: ICD-10-CM

## 2020-07-10 DIAGNOSIS — Z78.9 OTHER SPECIFIED HEALTH STATUS: ICD-10-CM

## 2020-07-10 DIAGNOSIS — I26.94 MULTIPLE SUBSEGMENTAL PULMONARY EMBOLI WITHOUT ACUTE COR PULMONALE: ICD-10-CM

## 2020-07-10 LAB
A1C WITH ESTIMATED AVERAGE GLUCOSE RESULT: 10.3 % — HIGH (ref 4–5.6)
ALBUMIN SERPL ELPH-MCNC: 1.9 G/DL — LOW (ref 3.3–5)
ALP SERPL-CCNC: 180 U/L — HIGH (ref 40–120)
ALT FLD-CCNC: 15 U/L — SIGNIFICANT CHANGE UP (ref 10–45)
ANION GAP SERPL CALC-SCNC: 9 MMOL/L — SIGNIFICANT CHANGE UP (ref 5–17)
APTT BLD: 65.5 SEC — HIGH (ref 27.5–35.5)
APTT BLD: 80 SEC — HIGH (ref 27.5–35.5)
APTT BLD: 91.2 SEC — HIGH (ref 27.5–35.5)
AST SERPL-CCNC: 25 U/L — SIGNIFICANT CHANGE UP (ref 10–40)
BILIRUB SERPL-MCNC: 0.3 MG/DL — SIGNIFICANT CHANGE UP (ref 0.2–1.2)
BUN SERPL-MCNC: 14 MG/DL — SIGNIFICANT CHANGE UP (ref 7–23)
CALCIUM SERPL-MCNC: 7.9 MG/DL — LOW (ref 8.4–10.5)
CHLORIDE SERPL-SCNC: 102 MMOL/L — SIGNIFICANT CHANGE UP (ref 96–108)
CO2 SERPL-SCNC: 24 MMOL/L — SIGNIFICANT CHANGE UP (ref 22–31)
CREAT SERPL-MCNC: 0.6 MG/DL — SIGNIFICANT CHANGE UP (ref 0.5–1.3)
ESTIMATED AVERAGE GLUCOSE: 249 MG/DL — HIGH (ref 68–114)
GLUCOSE BLDC GLUCOMTR-MCNC: 180 MG/DL — HIGH (ref 70–99)
GLUCOSE BLDC GLUCOMTR-MCNC: 256 MG/DL — HIGH (ref 70–99)
GLUCOSE BLDC GLUCOMTR-MCNC: 272 MG/DL — HIGH (ref 70–99)
GLUCOSE BLDC GLUCOMTR-MCNC: 279 MG/DL — HIGH (ref 70–99)
GLUCOSE SERPL-MCNC: 175 MG/DL — HIGH (ref 70–99)
HCT VFR BLD CALC: 24.4 % — LOW (ref 39–50)
HGB BLD-MCNC: 7.9 G/DL — LOW (ref 13–17)
MCHC RBC-ENTMCNC: 26.5 PG — LOW (ref 27–34)
MCHC RBC-ENTMCNC: 32.4 GM/DL — SIGNIFICANT CHANGE UP (ref 32–36)
MCV RBC AUTO: 81.9 FL — SIGNIFICANT CHANGE UP (ref 80–100)
NRBC # BLD: 0 /100 WBCS — SIGNIFICANT CHANGE UP (ref 0–0)
PLATELET # BLD AUTO: 188 K/UL — SIGNIFICANT CHANGE UP (ref 150–400)
POTASSIUM SERPL-MCNC: 3.7 MMOL/L — SIGNIFICANT CHANGE UP (ref 3.5–5.3)
POTASSIUM SERPL-SCNC: 3.7 MMOL/L — SIGNIFICANT CHANGE UP (ref 3.5–5.3)
PROT SERPL-MCNC: 5.8 G/DL — LOW (ref 6–8.3)
RBC # BLD: 2.98 M/UL — LOW (ref 4.2–5.8)
RBC # FLD: 14.2 % — SIGNIFICANT CHANGE UP (ref 10.3–14.5)
SODIUM SERPL-SCNC: 135 MMOL/L — SIGNIFICANT CHANGE UP (ref 135–145)
WBC # BLD: 9.2 K/UL — SIGNIFICANT CHANGE UP (ref 3.8–10.5)
WBC # FLD AUTO: 9.2 K/UL — SIGNIFICANT CHANGE UP (ref 3.8–10.5)

## 2020-07-10 PROCEDURE — 99233 SBSQ HOSP IP/OBS HIGH 50: CPT

## 2020-07-10 PROCEDURE — 99233 SBSQ HOSP IP/OBS HIGH 50: CPT | Mod: GC

## 2020-07-10 PROCEDURE — 99497 ADVNCD CARE PLAN 30 MIN: CPT

## 2020-07-10 RX ADMIN — Medication 2: at 08:00

## 2020-07-10 RX ADMIN — HEPARIN SODIUM 1900 UNIT(S)/HR: 5000 INJECTION INTRAVENOUS; SUBCUTANEOUS at 15:34

## 2020-07-10 RX ADMIN — POLYETHYLENE GLYCOL 3350 17 GRAM(S): 17 POWDER, FOR SOLUTION ORAL at 17:41

## 2020-07-10 RX ADMIN — Medication 6: at 12:07

## 2020-07-10 RX ADMIN — Medication 6: at 22:45

## 2020-07-10 RX ADMIN — Medication 6: at 17:41

## 2020-07-10 NOTE — PROGRESS NOTE ADULT - PROBLEM SELECTOR PLAN 2
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  - Diagnostic paracentesis on 7/9/2020: SAAG score of 0.5, suggests possible malignancy - will follow up with path report.  - Heme/Onc team spoke to him regarding possible malignancy - pt deferred the conversation. They will try to speak to him again tomorrow.   - f/u goals of care discussion with Palliative.

## 2020-07-10 NOTE — CONSULT NOTE ADULT - PROBLEM SELECTOR RECOMMENDATION 9
Currently on Heparin. No SOB/BORGES but reports weakness. Would like to continue treatment for PE/DVT.    Management as per primary team.
-Submassive PE likely related to underlying malignancy and significant immobility.   -PESI score 120 (age, sex, likely hx malignancy), Class IV/High risk (4-11.4%) 30 day mortality.   -No evidence of RHS on CT, PE's are extensive however and are seen in bilateral lobar and segmental branches of all lobes, dilated PA.   -Currently hemodynamically stable and satting well on RA and on a heparin gtt.   -Trops negative.     Recommend:  -Agree w/ heparin gtt for now, pt can be transitioned to an oral agent such as a DOAC or Lovenox once no procedures planned.   -Would recommend an echo and LE dopplers, if large LE clot burden then a discussion can be had regarding an IVC filter given extent clot burden in the lungs.   -No role for catheter directed intervention.

## 2020-07-10 NOTE — CONSULT NOTE ADULT - PROBLEM SELECTOR RECOMMENDATION 3
-A small plaque noted anteriorly on the right side, not calcified.   -The patient does not have any known hx of asbestos exposure but it may be possible that the he was exposed and did not know. He worked as a  in a factory for many years, unclear if this may have had some role.   -No intervention.
DNR DNI

## 2020-07-10 NOTE — GOALS OF CARE CONVERSATION - ADVANCED CARE PLANNING - CONVERSATION DETAILS
In addition to the EM visit, an advance care planning meeting was performed  Start time: 2:10pm  End time: 2:45pm  Total time: 35min  A face to face meeting to discuss advance care planning was held today regarding: SERGEY LUTHER  Primary decision maker: Patient  Discussed advance directives including, but not limited to, healthcare proxy and code status.  Decision regarding code status: DNR DNI  Documentation completed today: MOLST.     Discussed events leading to admission as well as findings that have been relayed to  the patient. He is aware of likely cancer diagnosis and would like to hear about his options. He would not want to be resuscitated or be maintained on life support. He would like his brother Yunier to be involved in his medical decisions as his proxy, but would need to discuss with him. Provided the patient with a Health Care Proxy form to review. Patient wants to go to rehab and would like to have further help with his care. He is deciding to go to a long term facility after rehab finishes. He would like to focus in [place] near his [place]. Notified SW and CM about the patients wishes and dispo plans.

## 2020-07-10 NOTE — CONSULT NOTE ADULT - ASSESSMENT
81 yo male pmhx DM2 noninsulin dependent bibems for acute onset profound weakness and was found to have [id number] bilateral PE and abnormal imaging.   Anemia- GAMALIEL w/ mixed picture  Elevated CEA  CTA on admission with extensive bilateral PE. Has been on heparin gtt.   CT abdomen significant for Replacement of normal pancreatic parenchyma with innumerable cystic lesions with simple fluid density. There appears to be a part solid component measuring 4.2 cm in the pancreatic tail. Could represent malignant IPMN.   MRCP with intra and extrahepatic biliary dilatation. The pancreas is replaced virtually entirely by cystic lesions.  Also evidence of subtle enhancement of these lesions in the pancreatic head and tail. 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 Moderate ascites.   He is also s/p diagnostic paracentesis.       PE  - Continue with heparin gtt  - Will need to switch at discharge to eliquis  - Likely hypercoagulable form underlying malignancy    Malignancy  - Imaging performed, possible malignant IPMN  - Currently patient did not want more workup (appeared overwhelmed)  - Discussed with him the possibility of this being cancer and he would like to deer further conversation to a later time.  - Will need further workup to reach diagnosis, GI following. For now patient does not want further workup  - Will recommend palliative evaluation     D/w Dr. Kahn 81 yo male pmhx DM2 noninsulin dependent bibems for acute onset profound weakness and was found to have [id number] bilateral PE and abnormal imaging.   Anemia- GAMALIEL w/ mixed picture  Elevated CEA  CTA on admission with extensive bilateral PE. Has been on heparin gtt.   CT abdomen significant for Replacement of normal pancreatic parenchyma with innumerable cystic lesions with simple fluid density. There appears to be a part solid component measuring 4.2 cm in the pancreatic tail. Could represent malignant IPMN.   MRCP with intra and extrahepatic biliary dilatation. The pancreas is replaced virtually entirely by cystic lesions.  Also evidence of subtle enhancement of these lesions in the pancreatic head and tail. 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 Moderate ascites.   He is also s/p diagnostic paracentesis.       PE  - Continue with heparin gtt  - Will need to switch at discharge to eliquis  - Likely hypercoagulable form underlying malignancy    Malignancy  - Imaging performed, possible malignant IPMN  - Currently patient did not want more workup (appeared overwhelmed)  - Discussed with him the possibility of this being cancer and he would like to defer further conversation to a later time. Will again  discuss with him over the weekend  - Will need further workup to reach diagnosis, GI following. For now patient does not want further workup  - Will recommend palliative evaluation     D/w Dr. Kahn

## 2020-07-10 NOTE — CONSULT NOTE ADULT - PROBLEM SELECTOR RECOMMENDATION 4
In addition to the EM visit, an advance care planning meeting was performed  Start time: 2:10pm  End time: 2:45pm  Total time: 35min  A face to face meeting to discuss advance care planning was held today regarding: SERGEY LUTHER  Primary decision maker: Patient  Discussed advance directives including, but not limited to, healthcare proxy and code status.  Decision regarding code status: DNR DNI  Documentation completed today: ALYSA

## 2020-07-10 NOTE — CONSULT NOTE ADULT - DOES PATIENT HAVE ADVANCE DIRECTIVE
3/21/2019         RE: Loco Landeros  [address] [place] [address]        Dear Colleague,    Thank you for referring your patient, Loco Landeros, to the [facility]. Please see a copy of my visit note below.    [facility] Dermatology Note      Dermatology Problem List:  1. Acne vulgaris  -Previous Tx: doxy   -Current Tx: clindamycin, BPO wash, tretinoin.     CC:   Chief Complaint   Patient presents with     Accutane     accutane       Encounter Date: Mar 21, 2019    History of Present Illness:  Mr. Loco Landeros is a 35 year old male who presents for evaluation of folliculitis and acne. The patient was last seen 2/15/19 for his lasted Accutane visit. Today, the patient reports that the hydrocortisone he was prescribed for the beard area seems to be helping. Ophthalmology cleared him after the last visit. The patient reports tolerable mucocutaneous dryness, and denies arthralgias, myalgias, depression, suicidal ideation, diarrhea, headache, or blurred vision.  Has lost weight with decreased diet and exercise.     Past Medical History:   Patient Active Problem List   Diagnosis     Degeneration of lumbar or lumbosacral intervertebral disc     Family history of malignant neoplasm of prostate     Low back pain     Nicotine use disorder     Past Medical History:   Diagnosis Date     NO ACTIVE PROBLEMS      Past Surgical History:   Procedure Laterality Date     COLONOSCOPY WITH CO2 INSUFFLATION N/A 10/2/2017    Procedure: COLONOSCOPY WITH CO2 INSUFFLATION;  Colonoscopy, [place] Ar[place], Rectal bleeding, BMI 29.74, [facility] [place] [phone]. ;  Surgeon: Justa Pickard MD;  Location:  OR     NO HISTORY OF SURGERY         Social History:  The patient works as a  for [employer].   He does drink 3-6 alcoholic beverages a week. He does not smoke or chew.     Family History:  No family hx of skin cancer.     Medications:  Current  Outpatient Medications   Medication Sig Dispense Refill     hydrocortisone 2.5 % cream Apply three times weekly for 1 month after shaving. Wait at least 10 minutes after shaving. 30 g 1     ISOtretinoin (ACCUTANE) 40 MG capsule Take 1 capsule (40 mg) by mouth daily 30 capsule 0     Multiple Vitamins-Minerals (MULTIVITAMIN ADULT) CHEW Take 2 chew tab by mouth daily       No Known Allergies      Review of Systems:  -Constitutional:In usual state of health. Admits to mild migraines.  -Skin: As above in HPI. No additional skin concerns.  - Rheum - admits to mild joint pain in left knee.   - Weight has dropped.     Physical exam:  Vitals: There were no vitals taken for this visit.  GEN: This is a well developed, well-nourished male in no acute distress, in a pleasant mood.    SKIN: Acne exam, which includes the face, neck, upper central chest, and upper central back was performed.  - Perifollicular erythema in the beard distribution  - 3 acneiform papules on the right cheek  - 3 acneiform nodules and 2 papules on the back  -No other lesions of concern on areas examined.     Impression/Plan:  1. Acne vulgaris - overall, the patient is continuing to improve on the medication; his back is flared up today with 5 lesions      Increase Accutane 60 mg daily.  One month supply with no refills provided.  Goal dose is 19703 to 78419  mg for 120-150 mg/kg dosing in this 107 kg patient.  We may go lower on this as this is based on current weight. Reviewed need to use condoms  Total cumulative dose 2160 mg.   We will re-weigh next visit.   Patient's I-pledge # is [phone] .   2. Psuedofolliculitis barbae- much improved    Continue  hydrocortisone 2.5% cream,  Recommend decrease to twice weekly    Follow-up 30 days.     Staff Involved:  Staff/Scribe     Scribe Disclosure  I, Juan Miller, am serving as a scribe to document services personally performed by Dr. Ricarda Barba MD, based on data collection and the provider's statements to  me.     Provider Disclosure:   The documentation recorded by the scribe accurately reflects the services I personally performed and the decisions made by me.    Ricarda Barba MD    Department of Dermatology  [facility] [place]: Phone: [phone], Fax:[phone]  [facility]: Phone: [phone], Fax: [phone]              Again, thank you for allowing me to participate in the care of your patient.        Sincerely,        Ricarda Barba MD     Yes

## 2020-07-10 NOTE — PHYSICAL THERAPY INITIAL EVALUATION ADULT - PLANNED THERAPY INTERVENTIONS, PT EVAL
neuromuscular re-education/postural re-education/balance training/bed mobility training/strengthening/gait training/transfer training

## 2020-07-10 NOTE — PHYSICAL THERAPY INITIAL EVALUATION ADULT - IMPAIRMENTS FOUND, PT EVAL
aerobic capacity/endurance/muscle strength/decreased midline orientation/posture/gait, locomotion, and balance/poor safety awareness

## 2020-07-10 NOTE — PROGRESS NOTE ADULT - SUBJECTIVE AND OBJECTIVE BOX
Patient is a 80y old  Male who presents with a chief complaint of Pulmonary embolsim (10 Jul 2020 15:16)      INTERVAL HPI/OVERNIGHT EVENTS:    Pt. seen and examined this morning  Pt. reports increased energy today, but still c/o generalized debility  Denies CP, SOB, abdominal pain, N/V    Review of Systems: 12 point review of systems otherwise negative    MEDICATIONS  (STANDING):  heparin  Infusion. 1900 Unit(s)/Hr (19 mL/Hr) IV Continuous <Continuous>  insulin lispro (HumaLOG) corrective regimen sliding scale   SubCutaneous Before meals and at bedtime  polyethylene glycol 3350 17 Gram(s) Oral every 24 hours  senna 2 Tablet(s) Oral at bedtime    MEDICATIONS  (PRN):      Allergies    No Known Allergies    Intolerances          Vital Signs Last 24 Hrs  T(C): 37 (10 Jul 2020 15:50), Max: 37.1 (09 Jul 2020 20:52)  T(F): 98.6 (10 Jul 2020 15:50), Max: 98.8 (09 Jul 2020 20:52)  HR: 77 (10 Jul 2020 15:50) (77 - 85)  BP: 112/69 (10 Jul 2020 15:50) (101/56 - 114/65)  BP(mean): --  RR: 18 (10 Jul 2020 15:50) (18 - 19)  SpO2: 91% (10 Jul 2020 15:50) (91% - 93%)  CAPILLARY BLOOD GLUCOSE      POCT Blood Glucose.: 272 mg/dL (10 Jul 2020 17:38)  POCT Blood Glucose.: 279 mg/dL (10 Jul 2020 11:57)  POCT Blood Glucose.: 180 mg/dL (10 Jul 2020 07:56)  POCT Blood Glucose.: 254 mg/dL (09 Jul 2020 21:19)      07-09 @ 07:01  -  07-10 @ 07:00  --------------------------------------------------------  IN: 228 mL / OUT: 0 mL / NET: 228 mL        Physical Exam:  (this morning)  Daily     Daily   General:  comfortable-appearing in NAD, +cachectic  HEENT:  MMM  Abdomen:  soft, NT, less distended   Extremities:  no edema B/L LE  Skin:  WWP  Neuro:  AAOx3    LABS:                        7.9    9.20  )-----------( 188      ( 10 Jul 2020 07:35 )             24.4     07-10    135  |  102  |  14  ----------------------------<  175<H>  3.7   |  24  |  0.60    Ca    7.9<L>      10 Jul 2020 07:35  Phos  2.5     07-09  Mg     2.1     07-09    TPro  5.8<L>  /  Alb  1.9<L>  /  TBili  0.3  /  DBili  x   /  AST  25  /  ALT  15  /  AlkPhos  180<H>  07-10    PTT - ( 10 Jul 2020 18:23 )  PTT:80.0 sec        RADIOLOGY & ADDITIONAL TESTS:    ---------------------------------------------------------------------------  I personally reviewed: [  ]EKG   [  ]CXR    [  ] CT    [  ]Other  ---------------------------------------------------------------------------  PLEASE CHECK WHEN PRESENT:     [  ]Heart Failure     [  ] Acute     [  ] Acute on Chronic     [  ] Chronic  -------------------------------------------------------------------     [  ]Diastolic [HFpEF]     [  ]Systolic [HFrEF]     [  ]Combined [HFpEF & HFrEF]     [  ]Other:  -------------------------------------------------------------------  [  ]BEKAH     [  ]ATN     [  ]Reneal Medullary Necrosis     [  ]Renal Cortical Necrosis     [  ]Other Pathological Lesions:    [  ]CKD 1  [  ]CKD 2  [  ]CKD 3  [  ]CKD 4  [  ]CKD 5  [  ]Other  -------------------------------------------------------------------  [  ]Other/Unspecified:    --------------------------------------------------------------------    Abdominal Nutritional Status  [  ]Malnutrition: See Nutrition Note  [  ]Cachexia  [  ]Other:   [  ]Supplement Ordered:  [  ]Morbid Obesity (BMI >=40]

## 2020-07-10 NOTE — PROGRESS NOTE ADULT - PROBLEM SELECTOR PLAN 3
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 Will likely d/c patient to subacute rehab.

## 2020-07-10 NOTE — CONSULT NOTE ADULT - PROBLEM SELECTOR RECOMMENDATION 6
Discussed events leading to admission as well as findings that have been relayed to  the patient. He is aware of likely cancer diagnosis and would like to hear about his options. He would not want to be resuscitated or be maintained on life support. He would like his brother Yunier to be involved in his medical decisions as his proxy, but would need to discuss with him. Provided the patient with a Health Care Proxy form to review. Patient wants to go to rehab and would like to have further help with his care. He is deciding to go to a long term facility after rehab finishes. He would like [place]. Notified SW and CM about the patients wishes and dispo plans.

## 2020-07-10 NOTE — CONSULT NOTE ADULT - SUBJECTIVE AND OBJECTIVE BOX
Hematology Oncology Consult Note     Initial HPI- 81 yo male pmhx DM2 noninsulin dependent bibems for acute onset profound weakness. Pt states he could not support himself last night and guided himself to the floor from which he could not stand up. He states he was on floor approximately 6-7 hours and was finally able to crawl to phone and call 911. Pt also endorses a back pain for 2 months that progressed to a back and stomach pain (does not endorse radiation). Simultaneously he has also noticed that he has decreased energy. Over the course of 3months 20lb weight loss with no change in appetite He denies cp/fever/cough/urinary incontinence/fever/chills. Pt endorses decreased appetite over past 2 months and abdominal distension x 2 months. Denies cp/soa. Denies black/bloody stools.    Heme onc consulted for PE and pancreatic mass seen on scan. CTA on admission with extensive bilateral PE. Has been on heparin gtt. CT abdomen significant for Replacement of normal pancreatic parenchyma with innumerable cystic lesions with simple fluid density. There appears to be a part solid component measuring 4.2 cm in the pancreatic tail. Could represent malignant IPMN. MRCP with intra and extrahepatic biliary dilatation. The pancreas is replaced virtually entirely by cystic lesions.  Also evidence of subtle enhancement of these lesions in the pancreatic head and tail. 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 Moderate ascites. Recommend correlation with endoscopic ultrasound with FNA of the pancreatic head and tail regions.   He is also s/p diagnostic paracentesis.           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 1900 Unit(s)/Hr IV Continuous <Continuous>        Social History: 40 PY smoking history    FAMILY HISTORY:  No pertinent family history in first degree relatives      PHYSICAL EXAM:    T(F): 98 (07-10-20 @ 09:06), Max: 98.8 (07-09-20 @ 20:52)  HR: 85 (07-10-20 @ 09:06) (75 - 85)  BP: 101/63 (07-10-20 @ 09:06) (101/56 - 114/65)  RR: 18 (07-10-20 @ 09:06) (18 - 19)  SpO2: 92% (07-10-20 @ 09:06) (92% - 94%)  Wt(kg): --    Daily     Daily     General: Well developed, well nourished, no acute distress  HEENT: NC/AT; PERRL, anicteric sclera; MMM  Neck: supple  Cardiovascular: +S1/S2, RRR, no murmurs, rubs, gallops  Respiratory: CTA B/L; no W/R/R  Gastrointestinal: soft, NT/ND; +BSx4  Extremities: WWP; no edema, clubbing or cyanosis  Vascular: 2+ radial, DP/PT pulses B/L  Neurological: AAOx3; no focal deficits    Labs:                          7.9    9.20  )-----------( 188      ( 10 Jul 2020 07:35 )             24.4     CBC Full  -  ( 10 Jul 2020 07:35 )  WBC Count : 9.20 K/uL  RBC Count : 2.98 M/uL  Hemoglobin : 7.9 g/dL  Hematocrit : 24.4 %  Platelet Count - Automated : 188 K/uL  Mean Cell Volume : 81.9 fl  Mean Cell Hemoglobin : 26.5 pg  Mean Cell Hemoglobin Concentration : 32.4 gm/dL  Auto Neutrophil # : x  Auto Lymphocyte # : x  Auto Monocyte # : x  Auto Eosinophil # : x  Auto Basophil # : x  Auto Neutrophil % : x  Auto Lymphocyte % : x  Auto Monocyte % : x  Auto Eosinophil % : x  Auto Basophil % : x    PTT - ( 10 Jul 2020 12:19 )  PTT:91.2 sec    07-10    135  |  102  |  14  ----------------------------<  175<H>  3.7   |  24  |  0.60    Ca    7.9<L>      10 Jul 2020 07:35  Phos  2.5     07-09  Mg     2.1     07-09    TPro  5.8<L>  /  Alb  1.9<L>  /  TBili  0.3  /  DBili  x   /  AST  25  /  ALT  15  /  AlkPhos  180<H>  07-10

## 2020-07-10 NOTE — PROGRESS NOTE ADULT - PROBLEM SELECTOR PLAN 1
PESI Class 4; cont. A/C w/ heparin gtt for now; pending further GOC discussions, if no plan for further inpatient procedures, will change A/C to PO DOAC, likely longterm - B/l extensive PE, confirmed w/ CT.  PESI score of 120, has an overall high risk of mortality and morbidity. US Duplex: Acute DVTs involving b/l extremities.   - Continuing Heparin drip, PTT currently therapeutic on 19 mL/hr Heparin drip; change accordingly to reach PTT goal- 58-99. Continue PTTs q6hrs. Will switch to long-term NOAC once confirmed that pt won't need any further procedures - may get EUS w/ FNA with GI. Will f/u with Palliative for goals of care.

## 2020-07-10 NOTE — CONSULT NOTE ADULT - SUBJECTIVE AND OBJECTIVE BOX
SERGEY LUTHER          MRN-[id number]            (mm/dd/yyyy)    HPI:  81 yo male pmhx DM2 noninsulin dependent bibems for acute onset profound weakness. Pt states he could not support himself last night and guided himself to the floor from which he could not stand up. He states he was on floor approximately 6-7 hours and was finally able to crawl to phone and call 911. Pt also endorses a back pain for 2 months that progressed to a back and stomach pain (does not endorse radiation). Simultaneously he has also noticed that he has decreased energy. Over the course of 3months 20lb weight loss with no change in appetite He denies cp/fever/cough/urinary incontinence/fever/chills. Pt endorses decreased appetite over past 2 months and abdominal distension x 2 months. Denies cp/soa. Denies black/bloody stools. 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, with long-term current use of insulin  No significant past surgical history      FAMILY HISTORY:  No pertinent family history in first degree relatives   Reviewed and found non contributory in mother or father    SOCIAL HISTORY:     ROS:    Unable to attain due to:                      Dyspnea (Edwige 0-10):                        N/V (Y/N):                              Secretions (Y/N) :                Agitation(Y/N):   Pain (Y/N):       [email or web address]/painad.[email or web address]  [email or web address].[email or web address]/__data/assets/pdf_file/0018/722864/[place]_Pain_Scale_Final.pdf  -Provocation/Palliation:  -Quality/Quantity:  -Radiating:  -Severity:  -Timing/Frequency:  -Impact on ADLs:    General:  Denied  HEENT:    Denied  Neck:  Denied  CVS:  Denied  Resp:  Denied  GI:  Denied Last BM:     :  Denied  Musc:  Denied  Neuro:  Denied  Psych:  Denied  Skin:  Denied  Lymph:  Denied    Allergies    No Known Allergies    Intolerances      Opiate Naive (Y/N):   -iStop reviewed (Y/N): Yes. No Rx found on iStop review (Ref#:           )    Medications:      MEDICATIONS  (STANDING):  heparin  Infusion. 1900 Unit(s)/Hr (19 mL/Hr) IV Continuous <Continuous>  insulin lispro (HumaLOG) corrective regimen sliding scale   SubCutaneous Before meals and at bedtime  polyethylene glycol 3350 17 Gram(s) Oral every 24 hours  senna 2 Tablet(s) Oral at bedtime    MEDICATIONS  (PRN):      Labs:    CBC:                        7.9    9.20  )-----------( 188      ( 10 Jul 2020 07:35 )             24.4     CMP:    07-10    135  |  102  |  14  ----------------------------<  175<H>  3.7   |  24  |  0.60    Ca    7.9<L>      10 Jul 2020 07:35  Phos  2.5       Mg     2.1         TPro  5.8<L>  /  Alb  1.9<L>  /  TBili  0.3  /  DBili  x   /  AST  25  /  ALT  15  /  AlkPhos  180<H>  07-10   Albumin, Serum: 1.9 g/dL (07-10-20 @ 07:35)    PTT - ( 10 Jul 2020 12:19 )  PTT:91.2 sec       Imaging:  Reviewed    PEx:  T(C): 36.7 (07-10-20 @ 09:06), Max: 37.1 (20 @ 20:52)  HR: 85 (07-10-20 @ 09:06) (75 - 85)  BP: 101/63 (07-10-20 @ 09:06) (101/56 - 114/65)  RR: 18 (07-10-20 @ 09:06) (18 - 19)  SpO2: 92% (07-10-20 @ 09:06) (92% - 94%)  Wt(kg): --  Daily     Daily     General:   HEENT:    Neck:   CVS:   Resp:   GI:    :    Musc:     Neuro:   Psych:     Skin:  Lymph:  Preadmit Karnofsky:  %           Current Karnofsky:     %  http://www.npcrc.org/files/news/karnofsky_performance_scale.pdf   http://www.npcrc.org/files/news/palliative_performance_scale_PPSv2.pdf  Cachexia (Y/N):   BMI:    Advanced Directives:     Full Code     DNR/DNI     MOLST     HCP     DPOA     Living Will     Decision maker:  Legal surrogate:    GOALS OF CARE DISCUSSION       Palliative care info/counseling provided	           Family meeting       Advanced Directives addressed please see Advance Care Planning Note	           See previous Palliative Medicine Note       Documentation of GOC: 	    REFERRALS	        Palliative Med        Unit SW/Case Mgmt              Speech/Swallow       Patient/Family Support       Massage Therapy       Music Therapy       Pet Therapy       Hospice       Nutrition       Dietician       PT/OT SERGEY KRAUSE          MRN-[id number]            (1940)    HPI:  79 yo male pmhx DM2 noninsulin dependent bibems for acute onset profound weakness. Pt states he could not support himself last night and guided himself to the floor from which he could not stand up. He states he was on floor approximately 6-7 hours and was finally able to crawl to phone and call 911. Pt also endorses a back pain for 2 months that progressed to a back and stomach pain (does not endorse radiation). Simultaneously he has also noticed that he has decreased energy. Over the course of 3months 20lb weight loss with no change in appetite He denies cp/fever/cough/urinary incontinence/fever/chills. Pt endorses decreased appetite over past 2 months and abdominal distension x 2 months. Denies cp/soa. Denies black/bloody stools. 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 No children. Retired used to work in [employer]. Bilingual Japanese speaker originally from [place]. Living alone in [facility] apartment. [employer] [facility] Medicare. [employer].     ROS:                   Dyspnea (Edwige 0-10): 0                       N/V (Y/N):  No                            Secretions (Y/N) : No                Agitation(Y/N): No  Pain (Y/N): No      -Provocation/Palliation: N/A  -Quality/Quantity: N/A  -Radiating: N/A  -Severity: No pain  -Timing/Frequency: N/A  -Impact on ADLs: N/A    General:  Weight loss  HEENT:    Denied  Neck:  Denied  CVS:  Denied  Resp:  Denied  GI:  Decreased appetite  :  Denied  Musc:  Weak  Neuro:  Denied  Psych:  Denied  Skin:  Denied  Lymph:  Denied    Allergies: No Known Allergies or Intolerances    Opiate Naive (Y/N): Yes  -iStop reviewed (Y/N): Yes. No Rx found on iStop review. Ref #: [id number]   OPIOID NAIVE    Medications:      MEDICATIONS  (STANDING):  heparin  Infusion. 1900 Unit(s)/Hr (19 mL/Hr) IV Continuous <Continuous>  insulin lispro (HumaLOG) corrective regimen sliding scale   SubCutaneous Before meals and at bedtime  polyethylene glycol 3350 17 Gram(s) Oral every 24 hours  senna 2 Tablet(s) Oral at bedtime     Labs:    CBC:                          7.9    9.20  )----( 188      ( 10 Jul 2020 07:35 )              24.4     Manual Differential (20 @ 15:12)    Target Cells: Slight    Polychromasia: Slight    Ovalocytes: Slight    Microcytosis: Slight    Macrocytosis: Slight    Riccardo Cell: Present    Hypochromia: Slight    Elliptocytes: Slight    Red Cell Morphology: Abnormal    Platelet Morphology: Normal    Manual Smear Verification: Performed    Band Neutrophils %: 14.0 %    Myelocytes %: 0.9 %    CMP:    07-10    135  |  102  |  14  ----------------------------<  175<H>  3.7   |  24  |  0.60    Ca    7.9<L>      10 Jul 2020 07:35  Phos  2.5       Mg     2.1         TPro  5.8<L>  /  Alb  1.9<L>  /  TBili  0.3  /  DBili  x   /  AST  25  /  ALT  15  /  AlkPhos  180<H>  07-10   Albumin, Serum: 1.9 g/dL (07-10-20 @ 07:35)    PTT - ( 10 Jul 2020 12:19 )  PTT:91.2 sec     POCT Blood Glucose.: 279: Notified RN mg/dL (07.10.20 @ 11:57)    A1C with Estimated Average Glucose Result: 10.3 % (07.10.20 @ 07:35)    Albumin, Fluid: 1.5  g/dL (20 @ 15:13)  Protein Total, Fluid (20 @ 15:13)    Fluid Source: Peritoneal    Protein Total, Fluid: 3.8  g/dL  Glucose, Fluid (20 @ 15:13)    Glucose, Fluid: 130 mg/dL    Fluid Source: Peritoneal    Cell Count, Body Fluid (20 @ 15:13)    Monocyte/Macrophage Count - Body Fluid: 10 %    Fluid Segmented Granulocytes: 38 %    Fluid Source: Peritoneal    Fluid Type: Peritoneal fl    Body Fluid Appearance: Hazy    BF Lymphocytes: 52 %    Tube Type: Sterile    Color - Body Fluid: Yellow    Total Nucleated Cell Count, Body Fluid: 1375: Peritoneal/Pleural/ Pericardial  Body Fluid Types            Total Nucleated cells: <500/uL            Neutrophils: <25%          Synovial Body Fluid Type           Total Nucleated cells: <150/uL           Neutrophils: <25%           Lymphocytes: <75%           Monocytes/Macrophages: </=70%  Please note reference range updated effective 2019 /uL    Total RBC Count: 1000 /uL    Occult Blood, Feces: Negative  (20 @ 13:20)    Alcohol, Blood: <3 mg/dL (20 @ 06:26)    Ammonia, Serum: <10 umol/L (20 @ 06:26)    COVID-19 PCR: NotDetec (20 @ 06:46)    Lactate, Blood: 1.7 mmoL/L (20 @ 06:26)    Lipase, Serum: 21 U/L (20 @ 06:26)    Type + Screen (20 @ 05:30)    ABO Interpretation: A    Rh Interpretation: Positive    Antibody Screen: Negative    Iron with Total Binding Capacity in AM (20 @ 07:12)    Iron - Total Binding Capacity.: 130 ug/dL    % Saturation, Iron: 7 %    Iron Total, Serum: 9 ug/dL    Unsaturated Iron Binding Capacity: 121 ug/dL    Ferritin, Serum: 234 ng/mL (20 @ 07:12)    Thyroid Stimulating Hormone, Serum: 2.574 uIU/mL (20 @ 06:26)    Vitamin B12, Serum: 1250 pg/mL (20 @ 11:15)    Folate, Serum: 6.4 ng/mL (20 @ 11:15)    Reticulocyte Count in AM (20 @ 07:12)    RBC Count: 2.70 M/uL    Reticulocyte Percent: 1.6 %    Absolute Reticulocytes: 42.1 K/uL    Haptoglobin, Serum: 272 mg/dL (20 @ 07:12)    Imaging:  Reviewed    EXAM:  XR CHEST AP OR PA 1V     PROCEDURE DATE:  2020    INTERPRETATION:  XR CHEST dated 2020 7:04 AM  CLINICAL INFORMATION: Male, 80 years old.  weakness.  PRIOR STUDIES: None  FINDINGS: Heart size, mediastinal and hilar contours are limited due to the portable technique. There is some prominence of the pulmonary arteries and pulmonary artery hypertension cannot be excluded. Visualized lung zones are clear. Soft tissues and osseous structures are intact. Degenerative changes are noted to involve both acromioclavicular joint spaces.  IMPRESSION:Prominence of the pulmonary arteries and mild pulmonary hypertension cannot excluded.  No consolidation or pleural effusions.    EXAM:  US DPLX LWR EXT VEINS COMPL BI                    PROCEDURE DATE:  2020    INTERPRETATION:    VENOUS DUPLEX DOPPLER OF BOTH LOWER EXTREMITIES dated 2020 1:44 PM  INDICATION: History of acute PE  TECHNIQUE: Duplex Doppler evaluation including gray-scale ultrasound imaging, color flow Doppler imaging, and Doppler spectral analysis of the veins of both lower extremities was performed.   COMPARISON: None  FINDINGS:  Thigh veins: There is near occlusive echogenic thrombus within the inferior left popliteal vein which is noncompressible, consistent with DVT. The common femoral, femoral, right popliteal, proximal greater saphenous, and proximal deep femoral veins are patent and free of thrombus bilaterally. The remaining veins are normally compressible and have normal phasic flow and augmentation response.  Calf veins: There are hypoechoic filling defects within the right posterior tibial and peroneal veins which are noncompressible, consistent with DVT. The thrombus extends distally into the intramuscular right calf veins. In addition, the left peroneal veins are also positive for DVT. The left posterior veins are patent.  IMPRESSION:  Right leg: Acute DVT involving the posterior tibial and peroneal veins.  Left leg: Acute DVT in the  popliteal vein and peroneal veins.  The above findings were discussed with Dr. Sims on 2020 at 1439.    EXAM:  CT BRAIN           PROCEDURE DATE:  2020    INTERPRETATION:  PROCEDURE: CT head without intravenous contrast  INDICATIONS: Weakness and inability to ambulate.  TECHNIQUE:  Serial axial images were obtained from the skull base to the vertex without the use of intravenous contrast. Coronal and sagittal reconstructions were created.  COMPARISON EXAMINATION: CT head dated 10/24/2014.  FINDINGS:    There is no acute intra-axial or extra axial hemorrhage. There is no mass effect or shift of the midline. The basal cisterns are not effaced. There is cerebral volume loss with prominence of the ventricles and sulci. There are mild chronic ischemic changes in the frontoparietal white matter. There is no CT evidence of an acute vascular territorial infarct. There are mild atherosclerotic calcifications of the intracranial carotid arteries and intradural vertebral arteries.  The regional soft tissues and osseous structures are unremarkable apart from chronic deformity of the nasal bones. The visualized paranasal sinuses and tympanic/mastoid cavities are clear apart from mild bilateral ethmoid mucosal thickening.  IMPRESSION:   No acute intracranial hemorrhage, mass effect, or CT evidence of an acute vascular territorial infarct.    EXAM:  CT ABDOMEN AND PELVIS OC IC              PROCEDURE DATE:  2020    INTERPRETATION:  CT SCAN OF ABDOMEN AND PELVIS  History: Abdominal distention. Weakness.  Technique: CT scan of abdomen and pelvis was performed from lung bases through symphysis pubis. Intravenous and oral contrast material were utilized. Axial, sagittal and coronal reformatted images were reviewed. 100 mL of Omnipaque 350 injected.  Comparison: None.  Findings:   Lower chest: Multiple bilateral pulmonary emboli. Bilateral small pleural effusions, left greater than right, with adjacent atelectasis. Interlobular septal thickening.  Liver:  Small hyperdense lesion measuring 0.6 cm in liver segment 8, may represent a hemangioma. A few small hypo densities are seen throughout the liver, the largest of which measures 1.9 cm, likely benign liver cysts. Mild intrahepatic biliary dilatation. The common bile duct is dilated measuring up to 1.2 cm.  Gallbladder: No radiopaque stones gallbladder. The gallbladder is mildly distended.  Spleen:  Not enlarged. 4.7 cm ill-defined hypodensity in the posterior lateral spleen differential diagnosis would include splenic infarct, metastasis.  Pancreas:  Innumerable cystic lesions throughout the pancreas which have replaced the normal pancreatic parenchyma and measure up to 2.5 cm with simple fluid density and thin internal septations. In addition there appears to be a 4.2 x 3.0 x 3.3 cm part solid lesion in relation to the pancreatic tail abutting the posterior stomach. The splenic vein is occluded.  Adrenal glands:  Normal.  Kidneys: The left kidney features multiple large cystic structures with peripheral calcification. The largest of these cysts originates from the lower pole and measures approximately 17.4 x 23.0 x 21.2 cm. The latter contains peripheral calcification. The latter cyst is causing marked displacement of small and large bowel loops. The second left renal cyst originates from the interpolar region and measures approximately 9.9 x 6.4 x 7.8 cm. . There are two simple cysts in the right kidney, the largest of which measures 3.0 cm. No hydronephrosis. No renal or ureteral calculi.  Adenopathy:  No lymphadenopathy in abdomen or pelvis.  Ascites: Moderate. Parietal peritoneal thickening.  Gastrointestinal tract: Normal. Normal appendix. Colon is loaded with fecal material.  Vessels: Normal.  Pelvic organs: Unremarkable prostate.  Soft tissues: Anasarca.  Bones: Mild degenerative changes inthe spine.  Impression:   1. Numerous bilateral pulmonary emboli with bilateral small pleural effusions . See CTPE from same day for additional information.  2. Replacement of normal pancreatic parenchyma with innumerable cystic lesions with simple fluid density. There appears to be a part solid component measuring 4.2 cm in the pancreatic tail. Could represent malignant IPMN . Recommend endoscopic ultrasound/FNA for further evaluation as well as MR/MRCP.  3. Moderate ascites. Parietal peritoneal thickening.       EXAM:  CT ANGIO CHEST PE PROTOCOL IC             PROCEDURE DATE:  2020    INTERPRETATION:  CTA (CT angiography) of the CHEST dated 2020 9:40 AM  INDICATION: Abnormality seen on CT abdomen. Assess for pulmonary embolism.  TECHNIQUE: CT angiography of the chest was performed during bolus injection of intravenous contrast.  Post-processing including the production of axial, coronal and sagittal multiplanar reformatted images and axial and coronal maximum intensityprojections (MIPs) was performed.  PRIOR STUDY: None.  FINDINGS: Extensive acute PE seen including right upper lobe lobar pulmonary artery, right interlobar pulmonary artery, right middle lobe lobar pulmonary artery, right lower lobe lobar and segmental pulmonary arteries, left lower lobe lobar and segmental pulmonary arteries, left upper lobe lobar pulmonary artery, left interlobar pulmonary artery.. The thoracic aorta shows aneurysmal dilatation of the ascending aorta measuring up to 4.3 cm. Aneurysmal dilatation of the aortic root up to 4.5 cm. Mild aneurysmal dilatation of the descending thoracic aorta measuring up to 3.7 cm proximally. The aortic arch measures 3.2 cm. Left common carotid artery arises off the right brachiocephalic artery.. The heart is normal in size. No right ventricular strain. No pericardial effusion is seen. No mediastinal, hilar or axillary lymphadenopathy is seen. Dependent mucus seen in the trachea. Enlarged main pulmonary artery measuring 4.4 cm suggesting pulmonary arterial hypertension. Coronary artery calcifications.  Trace pleural effusions are seen. Noncalcified right anterior pleural plaques suggesting asbestos exposure. Passive atelectasis left lower lobe.. 4 mm solid nodule left upper lobe image 47 series 3.  Limited evaluation of the upper abdomen demonsrates moderate ascites. Parietal peritoneal thickening suggesting peritoneal carcinomatosis. Dilated intrahepatic bile ducts extensive cystic and to lesser extent solid process involving the pancreas. Differential diagnosis includes the malignant form of IPMN.  Evaluation of the osseous structures demonstrates mild degenerative changes. Focal sclerosis left humeral head. Probable hemangioma T7 vertebral body.  Anasarca.  IMPRESSION: Extensive acute bilateral PE.  Findings called in to the [place] [facility] at 10:48 AM 2020    EXAM:  MR MRCP WAW IC          PROCEDURE DATE:  2020    INTERPRETATION:  MRI of the ABDOMEN with and without gadolinium including MRCP dated 2020 10:23 PM  INDICATION: Suspicion of pancreatic cancer. Abnormal CT abdomen. PE.  TECHNIQUE: MRI of the abdomen was performed in the axial, coronal, and radial projections with attention to the pancreas.  Images were obtained before and after gadolinium. 7.5 ml of Gadavist was injected.  PRIOR STUDIES: CT abdomen 2020.  FINDINGS: Study is limited due to the fact the patient could not cooperate with breath-hold.   Images of the upper abdomen demonstrate a few liver cysts versus biliary hamartomas up to 1.8 cm diameter.   Moderately dilated intrahepatic and extrahepatic bile ducts are seen. The common bile duct measures up to 1.3 cm. The dilated common bile duct is seen down to the region of the pancreatic head. The gallbladder is borderline distended. No cholelithiasis..    The pancreatic duct is not dilated. The pancreatic head, neck, body, tail are replaced by numerous cystic lesions up to 5.2 cm diameter .The pancreatic duct is not seen.. The splenic vein appears occluded.  Left branch, right branch, main portal vein are patent. No splenomegaly. There is extension of the pancreatic tail process into the medial spleen.  The adrenal glands are unremarkable.  Both kidneys opacify symmetrically with contrast without evidence of hydronephrosis.  Bilateral renal cortical cysts. This includes a massive 23.6 x 14.4 x 20.1 cm exophytic cortical cyst arising from the lower pole left kidney.  No abdominal aortic aneurysm is seen.  No retroperitoneal lymphadenopathy is seen. Moderate ascites. Possible parietal peritoneal enhancement- thus peritoneal carcinomatosis.  Probable hemangioma involving one of the lower thoracic vertebral bodies.  IMPRESSION: Intra and extrahepatic biliary dilatation. No cholelithiasis or  choledocholithiasis.The pancreas is replaced virtually entirely by cystic lesions. A few of the pancreatic lesions show restricted diffusion on DWI imaging and ADC map in relation to the pancreatic head and pancreatic tail. Also evidence of subtle enhancement of these lesions in the pancreatic head and tail. 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 Moderate ascites. Recommend correlation with endoscopic ultrasound with FNA of the pancreatic head and tail regions.    EXAM:  IR INTERVENTIONAL RAD PROC                 PROCEDURE DATE:  2020    INTERPRETATION:    Clinical History: Ascites  : Yousuf Inman MD  Anesthesia: Lidocaine for local anesthesia  Complications: None  Procedure: Following informed consent the patient was placed in the supine position and the right lower quadrant prepped and draped in a sterile fashion. Physiologic monitoring was performed throughout the procedure. Under ultrasound guidance the peritoneal fluid collection was accessed with a multi sidehole needle catheter system. Approximately 60 cc of clear yellow fluid was recovered and sent for cytological and chemical analysis. The catheter was removed.  The patient tolerated the procedure well and left the department in satisfactory condition. There were no immediate complications.  Findings:  There is a moderate amount of ascites.  A permanent US image of the needle and catheter within the fluid were recorded.  Impression: Diagnostic paracentesis as described above.  Dr. Inman was present during the entire examination.    12 Lead ECG (20 @ 17:01)   Ventricular Rate 75 BPM  Atrial Rate 75 BPM  P-R Interval 174 ms  QRS Duration 90 ms  Q-T Interval 412 ms  QTC Calculation(Bezet) 460 ms  P Axis 70 degrees  R Axis 70 degrees  T Axis 71 degrees  Diagnosis Line Normal sinus rhythm    TTE Echo Complete w/o Contrast w/ Doppler (20 @ 15:38)   CONCLUSIONS:   1. Borderline normal left ventricular systolic function. Left ventricular ejection fraction is 50-55%.   2. Normal left and right ventricular size and systolic function.   3. Grade I left ventricular diastolic dysfunction.   4. Mildly dilated left atrium.   5. Mild aortic regurgitation.   6. There is an interatrial septal aneurysm.   7. No evidence of pulmonary hypertension, pulmonary artery systolic pressure is 28 mmHg.   8. Mildly dilated aortic root.   9. Trivial pericardial effusion.  2D AND M-MODE MEASUREMENTS (normal ranges within parentheses):  LV EF:           55 %      (>55%)    PEx:  T(C): 36.7 (07-10-20 @ 09:06), Max: 37.1 (20 @ 20:52)  HR: 85 (07-10-20 @ 09:06) (75 - 85)  BP: 101/63 (07-10-20 @ 09:06) (101/56 - 114/65)  RR: 18 (07-10-20 @ 09:06) (18 - 19)  SpO2: 92% (07-10-20 @ 09:06) (92% - 94%)  Wt(kg):  74.8Kg    General: AAOx4 Elderly thin man found in bed in NAD  HEENT: Temporal wasting PERRL EOMI Non icteric MOM   Neck: Supple No masses  CVS: RR S1S2 No M/G/R  Resp: Unlabored Non tachypneic  GI: Soft NT ND BS+   : Voiding freely  Musc: Generalized muscle wasting with decreased strength B/L LE  Neuro: Follows commands No focal deficits  Psych: Calm pleasant  Skin: Xerosis Non jaundiced  Lymph: Normal  Preadmit Karnofsky:  50%           Current Karnofsky:  40%  Cachexia (Y/N): Yes  BMI: 22.4    Advanced Directives:     DNR DNI     Documented MOLST today and placed in paper chart.      No documented HCP form found on [facility]     Other surrogates: Brother Yunier Krause [phone]     No Living will / POA / Advance directives found on [place] / [facility].     No documented GOC notes on [place]    Decision maker: The patient is able to have complex medical decision making conversations.  Legal surrogate: No documented HCP form found on [facility], but voiced wanting his brother to help him with decisions; Brother Yunier Krause [phone]     GOALS OF CARE DISCUSSION       Palliative care info/counseling provided	           Advanced Directives addressed please see Advance Care Planning Note	           Documentation of GOC: DNR DNI  Wants to go to [facility] and transition to long term nursing home residence. Does not want to return to his apartment. Would like to talk to oncologist about treatment they would offer.     REFERRALS	        Unit SW/Case Mgmt        - Declined       Hospice - Does not qualify       Nutrition - Following       PT/OT - Following

## 2020-07-10 NOTE — PROGRESS NOTE ADULT - PROBLEM SELECTOR PLAN 5
- As per pulm team, effusions are too small for intervention and are not causing the patient any symptoms. Continue to observe for now.

## 2020-07-10 NOTE — PROGRESS NOTE ADULT - PROBLEM SELECTOR PLAN 4
cont. Glucerna per Nutrition recs - Likely related to malignancy; pt hasn't had any signs of blood loss. Continue trending H/H; transfuse if <7.

## 2020-07-10 NOTE — CONSULT NOTE ADULT - ASSESSMENT
per Internal Medicine    81 y/o M w/    Problem/Plan - 1:  ·  Problem: Acute pulmonary embolism without acute cor pulmonale, unspecified pulmonary embolism type.  Plan: PESI Class 4; cont. A/C w/ heparin gtt for now; pending further GOC discussions, if no plan for further inpatient procedures, will change A/C to PO DOAC, likely longterm.     Problem/Plan - 2:  ·  Problem: Other ascites.  Plan: s/p IR paracentesis today, f/u fluid studies (suspect malignant).     Problem/Plan - 3:  ·  Problem: Abnormal MRI of abdomen.  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     Problem/Plan - 4:  ·  Problem: Severe protein-calorie malnutrition.  Plan: cont. Glucerna per Nutrition recs.

## 2020-07-10 NOTE — PROGRESS NOTE ADULT - ASSESSMENT
79 y/o M w/ 81 yo male pmhx DM2 noninsulin dependent bibems for acute onset PE with finding of malignant pancreatic cancer on MRCP.

## 2020-07-10 NOTE — PROGRESS NOTE ADULT - SUBJECTIVE AND OBJECTIVE BOX
OVERNIGHT EVENTS: Overnight PTT was therapeutic. No overnight events.     SUBJECTIVE / INTERVAL HPI: Patient seen and examined at bedside. Patient was doing well this morning. He tolerated the IR paracentesis well yesterday; denies any overnight pain/discomfort. Patient still hesitant to workup/treatment.     VITAL SIGNS:  Vital Signs Last 24 Hrs  T(C): 36.7 (10 Jul 2020 09:06), Max: 37.1 (09 Jul 2020 20:52)  T(F): 98 (10 Jul 2020 09:06), Max: 98.8 (09 Jul 2020 20:52)  HR: 85 (10 Jul 2020 09:06) (75 - 85)  BP: 101/63 (10 Jul 2020 09:06) (101/56 - 114/65)  BP(mean): --  RR: 18 (10 Jul 2020 09:06) (18 - 19)  SpO2: 92% (10 Jul 2020 09:06) (92% - 94%)    PHYSICAL EXAM:    General: Well developed, well nourished, no acute distress  HEENT: NC/AT; PERRL, anicteric sclera; MMM  Neck: supple  Cardiovascular: +S1/S2, RRR, no murmurs, rubs, gallops  Respiratory: CTA B/L; no W/R/R  Gastrointestinal: soft, NT/ND; +BSx4  Extremities: WWP; no edema, clubbing or cyanosis  Vascular: 2+ radial, DP/PT pulses B/L  Neurological: AAOx3; no focal deficits    MEDICATIONS:  MEDICATIONS  (STANDING):  heparin  Infusion. 1900 Unit(s)/Hr (19 mL/Hr) IV Continuous <Continuous>  insulin lispro (HumaLOG) corrective regimen sliding scale   SubCutaneous Before meals and at bedtime  polyethylene glycol 3350 17 Gram(s) Oral every 24 hours  senna 2 Tablet(s) Oral at bedtime    MEDICATIONS  (PRN):      ALLERGIES:  Allergies    No Known Allergies    Intolerances        LABS:                        7.9    9.20  )-----------( 188      ( 10 Jul 2020 07:35 )             24.4     07-10    135  |  102  |  14  ----------------------------<  175<H>  3.7   |  24  |  0.60    Ca    7.9<L>      10 Jul 2020 07:35  Phos  2.5     07-09  Mg     2.1     07-09    TPro  5.8<L>  /  Alb  1.9<L>  /  TBili  0.3  /  DBili  x   /  AST  25  /  ALT  15  /  AlkPhos  180<H>  07-10    PTT - ( 10 Jul 2020 12:19 )  PTT:91.2 sec    CAPILLARY BLOOD GLUCOSE      POCT Blood Glucose.: 279 mg/dL (10 Jul 2020 11:57)      RADIOLOGY & ADDITIONAL TESTS: Reviewed.    PLAN: OVERNIGHT EVENTS: Overnight PTT was therapeutic. No overnight events.     SUBJECTIVE / INTERVAL HPI: Patient seen and examined at bedside. Patient was doing well this morning. He tolerated the IR paracentesis well yesterday; denies any overnight pain/discomfort. Denies chills, SOB, palpitations, chest pain, N/V/D, abdominal pain. Patient still hesitant to workup/treatment - will discuss with him again over the weekend.     VITAL SIGNS:  Vital Signs Last 24 Hrs  T(C): 36.7 (10 Jul 2020 09:06), Max: 37.1 (09 Jul 2020 20:52)  T(F): 98 (10 Jul 2020 09:06), Max: 98.8 (09 Jul 2020 20:52)  HR: 85 (10 Jul 2020 09:06) (75 - 85)  BP: 101/63 (10 Jul 2020 09:06) (101/56 - 114/65)  BP(mean): --  RR: 18 (10 Jul 2020 09:06) (18 - 19)  SpO2: 92% (10 Jul 2020 09:06) (92% - 94%)    PHYSICAL EXAM:  General: Frail appearing, no acute distress  HEENT: NC/AT; PERRL, anicteric sclera  Cardiovascular: +S1/S2, RRR, no murmurs   Respiratory: CTA B/L; no W/R/R  Gastrointestinal: soft, distended, nontender, +fluid wave  Extremities: no edema, clubbing or cyanosis  Neurological: AAOx3; no focal deficits    MEDICATIONS:  MEDICATIONS  (STANDING):  heparin  Infusion. 1900 Unit(s)/Hr (19 mL/Hr) IV Continuous <Continuous>  insulin lispro (HumaLOG) corrective regimen sliding scale   SubCutaneous Before meals and at bedtime  polyethylene glycol 3350 17 Gram(s) Oral every 24 hours  senna 2 Tablet(s) Oral at bedtime    MEDICATIONS  (PRN):      ALLERGIES:  Allergies    No Known Allergies    Intolerances        LABS:                        7.9    9.20  )-----------( 188      ( 10 Jul 2020 07:35 )             24.4     07-10    135  |  102  |  14  ----------------------------<  175<H>  3.7   |  24  |  0.60    Ca    7.9<L>      10 Jul 2020 07:35  Phos  2.5     07-09  Mg     2.1     07-09    TPro  5.8<L>  /  Alb  1.9<L>  /  TBili  0.3  /  DBili  x   /  AST  25  /  ALT  15  /  AlkPhos  180<H>  07-10    PTT - ( 10 Jul 2020 12:19 )  PTT:91.2 sec    CAPILLARY BLOOD GLUCOSE      POCT Blood Glucose.: 279 mg/dL (10 Jul 2020 11:57)      RADIOLOGY & ADDITIONAL TESTS: Reviewed.

## 2020-07-10 NOTE — PROGRESS NOTE ADULT - PROBLEM SELECTOR PLAN 3
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

## 2020-07-10 NOTE — PHYSICAL THERAPY INITIAL EVALUATION ADULT - CRITERIA FOR SKILLED THERAPEUTIC INTERVENTIONS
impairments found/rehab potential/anticipated discharge recommendation/therapy frequency/functional limitations in following categories/risk reduction/prevention

## 2020-07-10 NOTE — PHYSICAL THERAPY INITIAL EVALUATION ADULT - GENERAL OBSERVATIONS, REHAB EVAL
Pt found semi supine in bed in NAD, +IV intact, agreeable to PT Eval and cleared by ARABELLA Gonzalez.

## 2020-07-10 NOTE — CONSULT NOTE ADULT - PROBLEM SELECTOR RECOMMENDATION 2
Reports having difficulty walking. Would like to have some rehab and likely would need further assistance hence asking to be placed in a nursing home. Per PT: rehabilitation facility; sub acute rehab 2-3x/week. Patient aware of possibility of pancreatic cancer and would be open to discuss treatment options.

## 2020-07-10 NOTE — CONSULT NOTE ADULT - ASSESSMENT
81 yo male pmhx DM2 noninsulin dependent bibems for acute onset profound weakness. Pt states he could not support himself last night and guided himself to the floor from which he could not stand up. He states he was on floor approximately 6-7 hours and was finally able to crawl to phone and call 911. Pt also endorses a back pain for 2 months that progressed to a back and stomach pain (does not endorse radiation). Simultaneously he has also noticed that he has decreased energy. Over the course of 3months 20lb weight loss with no change in appetite He denies cp/fever/cough/urinary incontinence/fever/chills. Pt endorses decreased appetite over past 2 months and abdominal distension x 2 months. Denies cp/soa. Denies black/bloody stools. (07 Jul 2020 13:53)

## 2020-07-11 LAB
ALBUMIN SERPL ELPH-MCNC: 1.8 G/DL — LOW (ref 3.3–5)
ALP SERPL-CCNC: 166 U/L — HIGH (ref 40–120)
ALT FLD-CCNC: 14 U/L — SIGNIFICANT CHANGE UP (ref 10–45)
ANION GAP SERPL CALC-SCNC: 8 MMOL/L — SIGNIFICANT CHANGE UP (ref 5–17)
APTT BLD: 70.8 SEC — HIGH (ref 27.5–35.5)
AST SERPL-CCNC: 23 U/L — SIGNIFICANT CHANGE UP (ref 10–40)
BILIRUB SERPL-MCNC: 0.3 MG/DL — SIGNIFICANT CHANGE UP (ref 0.2–1.2)
BLD GP AB SCN SERPL QL: NEGATIVE — SIGNIFICANT CHANGE UP
BUN SERPL-MCNC: 13 MG/DL — SIGNIFICANT CHANGE UP (ref 7–23)
CALCIUM SERPL-MCNC: 7.7 MG/DL — LOW (ref 8.4–10.5)
CHLORIDE SERPL-SCNC: 104 MMOL/L — SIGNIFICANT CHANGE UP (ref 96–108)
CO2 SERPL-SCNC: 24 MMOL/L — SIGNIFICANT CHANGE UP (ref 22–31)
CREAT SERPL-MCNC: 0.68 MG/DL — SIGNIFICANT CHANGE UP (ref 0.5–1.3)
GLUCOSE BLDC GLUCOMTR-MCNC: 186 MG/DL — HIGH (ref 70–99)
GLUCOSE BLDC GLUCOMTR-MCNC: 194 MG/DL — HIGH (ref 70–99)
GLUCOSE BLDC GLUCOMTR-MCNC: 210 MG/DL — HIGH (ref 70–99)
GLUCOSE BLDC GLUCOMTR-MCNC: 219 MG/DL — HIGH (ref 70–99)
GLUCOSE SERPL-MCNC: 182 MG/DL — HIGH (ref 70–99)
HCT VFR BLD CALC: 25.1 % — LOW (ref 39–50)
HGB BLD-MCNC: 8 G/DL — LOW (ref 13–17)
MAGNESIUM SERPL-MCNC: 1.9 MG/DL — SIGNIFICANT CHANGE UP (ref 1.6–2.6)
MCHC RBC-ENTMCNC: 26.5 PG — LOW (ref 27–34)
MCHC RBC-ENTMCNC: 31.9 GM/DL — LOW (ref 32–36)
MCV RBC AUTO: 83.1 FL — SIGNIFICANT CHANGE UP (ref 80–100)
NRBC # BLD: 0 /100 WBCS — SIGNIFICANT CHANGE UP (ref 0–0)
PHOSPHATE SERPL-MCNC: 2 MG/DL — LOW (ref 2.5–4.5)
PLATELET # BLD AUTO: 197 K/UL — SIGNIFICANT CHANGE UP (ref 150–400)
POTASSIUM SERPL-MCNC: 4.3 MMOL/L — SIGNIFICANT CHANGE UP (ref 3.5–5.3)
POTASSIUM SERPL-SCNC: 4.3 MMOL/L — SIGNIFICANT CHANGE UP (ref 3.5–5.3)
PROT SERPL-MCNC: 6 G/DL — SIGNIFICANT CHANGE UP (ref 6–8.3)
RBC # BLD: 3.02 M/UL — LOW (ref 4.2–5.8)
RBC # FLD: 14.1 % — SIGNIFICANT CHANGE UP (ref 10.3–14.5)
RH IG SCN BLD-IMP: POSITIVE — SIGNIFICANT CHANGE UP
SODIUM SERPL-SCNC: 136 MMOL/L — SIGNIFICANT CHANGE UP (ref 135–145)
WBC # BLD: 7.33 K/UL — SIGNIFICANT CHANGE UP (ref 3.8–10.5)
WBC # FLD AUTO: 7.33 K/UL — SIGNIFICANT CHANGE UP (ref 3.8–10.5)

## 2020-07-11 PROCEDURE — 99232 SBSQ HOSP IP/OBS MODERATE 35: CPT | Mod: GC

## 2020-07-11 RX ORDER — APIXABAN 2.5 MG/1
10 TABLET, FILM COATED ORAL EVERY 12 HOURS
Refills: 0 | Status: DISCONTINUED | OUTPATIENT
Start: 2020-07-11 | End: 2020-07-14

## 2020-07-11 RX ADMIN — APIXABAN 10 MILLIGRAM(S): 2.5 TABLET, FILM COATED ORAL at 12:02

## 2020-07-11 RX ADMIN — HEPARIN SODIUM 1900 UNIT(S)/HR: 5000 INJECTION INTRAVENOUS; SUBCUTANEOUS at 05:27

## 2020-07-11 RX ADMIN — Medication 2: at 16:32

## 2020-07-11 RX ADMIN — Medication 4: at 12:01

## 2020-07-11 RX ADMIN — Medication 4: at 08:35

## 2020-07-11 RX ADMIN — Medication 2: at 22:54

## 2020-07-11 NOTE — PROGRESS NOTE ADULT - SUBJECTIVE AND OBJECTIVE BOX
Patient is a 80y old  Male who presents with a chief complaint of Pulmonary embolism (11 Jul 2020 06:27)      INTERVAL HPI/OVERNIGHT EVENTS:    Pt. seen and examined earlier today  Pt. reports increased energy  Pt. eager to go to [facility], c/o generalized debility  No bleeding sx    Review of Systems: 12 point review of systems otherwise negative    MEDICATIONS  (STANDING):  apixaban 10 milliGRAM(s) Oral every 12 hours  insulin lispro (HumaLOG) corrective regimen sliding scale   SubCutaneous Before meals and at bedtime  polyethylene glycol 3350 17 Gram(s) Oral every 24 hours  senna 2 Tablet(s) Oral at bedtime    MEDICATIONS  (PRN):      Allergies    No Known Allergies    Intolerances          Vital Signs Last 24 Hrs  T(C): 37.1 (11 Jul 2020 15:54), Max: 37.1 (11 Jul 2020 05:29)  T(F): 98.8 (11 Jul 2020 15:54), Max: 98.8 (11 Jul 2020 05:29)  HR: 75 (11 Jul 2020 15:54) (75 - 88)  BP: 116/73 (11 Jul 2020 15:54) (110/63 - 116/73)  BP(mean): --  RR: 18 (11 Jul 2020 15:54) (18 - 18)  SpO2: 95% (11 Jul 2020 15:54) (91% - 95%)  CAPILLARY BLOOD GLUCOSE      POCT Blood Glucose.: 186 mg/dL (11 Jul 2020 16:24)  POCT Blood Glucose.: 219 mg/dL (11 Jul 2020 11:32)  POCT Blood Glucose.: 210 mg/dL (11 Jul 2020 08:01)  POCT Blood Glucose.: 256 mg/dL (10 Jul 2020 22:06)  POCT Blood Glucose.: 272 mg/dL (10 Jul 2020 17:38)        Physical Exam:  (earlier today)  Daily     Daily   General:  comfortable-appearing in NAD, +cachectic, eating lunch  HEENT:  MMM  Abdomen:  soft, NT, less distended   Skin:  WWP  Neuro:  AAOx3    LABS:                        8.0    7.33  )-----------( 197      ( 11 Jul 2020 07:14 )             25.1     07-11    136  |  104  |  13  ----------------------------<  182<H>  4.3   |  24  |  0.68    Ca    7.7<L>      11 Jul 2020 07:14  Phos  2.0     07-11  Mg     1.9     07-11    TPro  6.0  /  Alb  1.8<L>  /  TBili  0.3  /  DBili  x   /  AST  23  /  ALT  14  /  AlkPhos  166<H>  07-11    PTT - ( 11 Jul 2020 07:14 )  PTT:70.8 sec        RADIOLOGY & ADDITIONAL TESTS:    ---------------------------------------------------------------------------  I personally reviewed: [  ]EKG   [  ]CXR    [  ] CT    [  ]Other  ---------------------------------------------------------------------------  PLEASE CHECK WHEN PRESENT:     [  ]Heart Failure     [  ] Acute     [  ] Acute on Chronic     [  ] Chronic  -------------------------------------------------------------------     [  ]Diastolic [HFpEF]     [  ]Systolic [HFrEF]     [  ]Combined [HFpEF & HFrEF]     [  ]Other:  -------------------------------------------------------------------  [  ]BEKAH     [  ]ATN     [  ]Reneal Medullary Necrosis     [  ]Renal Cortical Necrosis     [  ]Other Pathological Lesions:    [  ]CKD 1  [  ]CKD 2  [  ]CKD 3  [  ]CKD 4  [  ]CKD 5  [  ]Other  -------------------------------------------------------------------  [  ]Other/Unspecified:    --------------------------------------------------------------------    Abdominal Nutritional Status  [  ]Malnutrition: See Nutrition Note  [  ]Cachexia  [  ]Other:   [  ]Supplement Ordered:  [  ]Morbid Obesity (BMI >=40]

## 2020-07-11 NOTE — PROGRESS NOTE ADULT - SUBJECTIVE AND OBJECTIVE BOX
*INCOMPLETE   OVERNIGHT EVENTS:    SUBJECTIVE / INTERVAL HPI: Patient seen and examined at bedside.     VITAL SIGNS:  Vital Signs Last 24 Hrs  T(C): 37.1 (11 Jul 2020 05:29), Max: 37.1 (11 Jul 2020 05:29)  T(F): 98.8 (11 Jul 2020 05:29), Max: 98.8 (11 Jul 2020 05:29)  HR: 79 (11 Jul 2020 05:29) (77 - 85)  BP: 110/63 (11 Jul 2020 05:29) (101/63 - 115/67)  BP(mean): --  RR: 18 (11 Jul 2020 05:29) (18 - 18)  SpO2: 93% (11 Jul 2020 05:29) (91% - 93%)    PHYSICAL EXAM:    General: Well developed, well nourished, no acute distress  HEENT: NC/AT; PERRL, anicteric sclera; MMM  Neck: supple  Cardiovascular: +S1/S2, RRR, no murmurs, rubs, gallops  Respiratory: CTA B/L; no W/R/R  Gastrointestinal: soft, NT/ND; +BSx4  Extremities: WWP; no edema, clubbing or cyanosis  Vascular: 2+ radial, DP/PT pulses B/L  Neurological: AAOx3; no focal deficits    MEDICATIONS:  MEDICATIONS  (STANDING):  heparin  Infusion. 1900 Unit(s)/Hr (19 mL/Hr) IV Continuous <Continuous>  insulin lispro (HumaLOG) corrective regimen sliding scale   SubCutaneous Before meals and at bedtime  polyethylene glycol 3350 17 Gram(s) Oral every 24 hours  senna 2 Tablet(s) Oral at bedtime    MEDICATIONS  (PRN):      ALLERGIES:  Allergies    No Known Allergies    Intolerances        LABS:                        7.9    9.20  )-----------( 188      ( 10 Jul 2020 07:35 )             24.4     07-10    135  |  102  |  14  ----------------------------<  175<H>  3.7   |  24  |  0.60    Ca    7.9<L>      10 Jul 2020 07:35  Phos  2.5     07-09  Mg     2.1     07-09    TPro  5.8<L>  /  Alb  1.9<L>  /  TBili  0.3  /  DBili  x   /  AST  25  /  ALT  15  /  AlkPhos  180<H>  07-10    PTT - ( 10 Jul 2020 18:23 )  PTT:80.0 sec    CAPILLARY BLOOD GLUCOSE      POCT Blood Glucose.: 256 mg/dL (10 Jul 2020 22:06)      RADIOLOGY & ADDITIONAL TESTS: Reviewed.    PLAN: *INCOMPLETE   OVERNIGHT EVENTS: No overnight events.     SUBJECTIVE / INTERVAL HPI: Patient seen and examined at bedside. Patient doing well this morning - no changes since yesterday. He expresses concern regarding his discharge - he doesn't feel safe to go home.     VITAL SIGNS:  Vital Signs Last 24 Hrs  T(C): 37.1 (11 Jul 2020 05:29), Max: 37.1 (11 Jul 2020 05:29)  T(F): 98.8 (11 Jul 2020 05:29), Max: 98.8 (11 Jul 2020 05:29)  HR: 79 (11 Jul 2020 05:29) (77 - 85)  BP: 110/63 (11 Jul 2020 05:29) (101/63 - 115/67)  BP(mean): --  RR: 18 (11 Jul 2020 05:29) (18 - 18)  SpO2: 93% (11 Jul 2020 05:29) (91% - 93%)    PHYSICAL EXAM:    General: Well developed, well nourished, no acute distress  HEENT: NC/AT; PERRL, anicteric sclera; MMM  Neck: supple  Cardiovascular: +S1/S2, RRR, no murmurs, rubs, gallops  Respiratory: CTA B/L; no W/R/R  Gastrointestinal: soft, NT/ND; +BSx4  Extremities: WWP; no edema, clubbing or cyanosis  Vascular: 2+ radial, DP/PT pulses B/L  Neurological: AAOx3; no focal deficits    MEDICATIONS:  MEDICATIONS  (STANDING):  heparin  Infusion. 1900 Unit(s)/Hr (19 mL/Hr) IV Continuous <Continuous>  insulin lispro (HumaLOG) corrective regimen sliding scale   SubCutaneous Before meals and at bedtime  polyethylene glycol 3350 17 Gram(s) Oral every 24 hours  senna 2 Tablet(s) Oral at bedtime    MEDICATIONS  (PRN):      ALLERGIES:  Allergies    No Known Allergies    Intolerances        LABS:                        7.9    9.20  )-----------( 188      ( 10 Jul 2020 07:35 )             24.4     07-10    135  |  102  |  14  ----------------------------<  175<H>  3.7   |  24  |  0.60    Ca    7.9<L>      10 Jul 2020 07:35  Phos  2.5     07-09  Mg     2.1     07-09    TPro  5.8<L>  /  Alb  1.9<L>  /  TBili  0.3  /  DBili  x   /  AST  25  /  ALT  15  /  AlkPhos  180<H>  07-10    PTT - ( 10 Jul 2020 18:23 )  PTT:80.0 sec    CAPILLARY BLOOD GLUCOSE      POCT Blood Glucose.: 256 mg/dL (10 Jul 2020 22:06)      RADIOLOGY & ADDITIONAL TESTS: Reviewed.    PLAN: OVERNIGHT EVENTS: No overnight events.     SUBJECTIVE / INTERVAL HPI: Patient seen and examined at bedside. Patient doing well this morning - new no changes. Denies SOB, chest pain, palpitations N/V/D, abdominal pain. Patient does have unchanged b/l lower extremity weakness, and is in agreement that he needs subacute rehab.      VITAL SIGNS:  Vital Signs Last 24 Hrs  T(C): 37.1 (11 Jul 2020 05:29), Max: 37.1 (11 Jul 2020 05:29)  T(F): 98.8 (11 Jul 2020 05:29), Max: 98.8 (11 Jul 2020 05:29)  HR: 79 (11 Jul 2020 05:29) (77 - 85)  BP: 110/63 (11 Jul 2020 05:29) (101/63 - 115/67)  BP(mean): --  RR: 18 (11 Jul 2020 05:29) (18 - 18)  SpO2: 93% (11 Jul 2020 05:29) (91% - 93%)    PHYSICAL EXAM:    General:  Frail, no acute distress  HEENT: NC/AT; PERRL, anicteric sclera  Cardiovascular: +S1/S2, RRR, no murmurs, rubs, gallops  Respiratory: CTA B/L; no W/R/R  Gastrointestinal: soft, distended, non-tender, +BS  Extremities: WWP; no edema, clubbing or cyanosis  Neurological: AAOx3; no focal deficits    MEDICATIONS:  MEDICATIONS  (STANDING):  heparin  Infusion. 1900 Unit(s)/Hr (19 mL/Hr) IV Continuous <Continuous>  insulin lispro (HumaLOG) corrective regimen sliding scale   SubCutaneous Before meals and at bedtime  polyethylene glycol 3350 17 Gram(s) Oral every 24 hours  senna 2 Tablet(s) Oral at bedtime    MEDICATIONS  (PRN):      ALLERGIES:  Allergies    No Known Allergies    Intolerances        LABS:                        7.9    9.20  )-----------( 188      ( 10 Jul 2020 07:35 )             24.4     07-10    135  |  102  |  14  ----------------------------<  175<H>  3.7   |  24  |  0.60    Ca    7.9<L>      10 Jul 2020 07:35  Phos  2.5     07-09  Mg     2.1     07-09    TPro  5.8<L>  /  Alb  1.9<L>  /  TBili  0.3  /  DBili  x   /  AST  25  /  ALT  15  /  AlkPhos  180<H>  07-10    PTT - ( 10 Jul 2020 18:23 )  PTT:80.0 sec    CAPILLARY BLOOD GLUCOSE      POCT Blood Glucose.: 256 mg/dL (10 Jul 2020 22:06)      RADIOLOGY & ADDITIONAL TESTS: Reviewed.    PLAN:

## 2020-07-11 NOTE — PROGRESS NOTE ADULT - PROBLEM SELECTOR PLAN 2
- s/p MRCP - findings concerning for malignancy.   - Diagnostic paracentesis on 7/9/2020; f/u pathology report.   - As per GI, can consider EUS w/FNA to obtain dx, however, it's ultimately unlikely to . Pt currently deferring workup - can f/u as outpatient if he changes his decision.   - Palliative following - they noted that pt wishes to hear more about his possible malignancy, although he is deferring the conversation with Heme/Onc team for now. Can discuss when pt feels ready. Dispo plans were made to go to [facility] and then transition to long-term care.

## 2020-07-11 NOTE — PROGRESS NOTE ADULT - PROBLEM SELECTOR PLAN 3
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

## 2020-07-11 NOTE — PROGRESS NOTE ADULT - PROBLEM SELECTOR PLAN 1
- B/l extensive PE, confirmed w/ CT.  PESI score of 120, has an overall high risk of mortality and morbidity. US Duplex: Acute DVTs involving b/l extremities.   - d/c heparin drip. Will start loading dose of Eliquis 10 mg BID.

## 2020-07-11 NOTE — PROGRESS NOTE ADULT - PROBLEM SELECTOR PLAN 4
- Hemoglobin stable ~8; Likely related to malignancy; pt hasn't had any signs of blood loss. Continue trending H/H; transfuse if <7.

## 2020-07-12 LAB
ANION GAP SERPL CALC-SCNC: 9 MMOL/L — SIGNIFICANT CHANGE UP (ref 5–17)
APTT BLD: 27.3 SEC — LOW (ref 27.5–35.5)
BUN SERPL-MCNC: 13 MG/DL — SIGNIFICANT CHANGE UP (ref 7–23)
CALCIUM SERPL-MCNC: 8 MG/DL — LOW (ref 8.4–10.5)
CHLORIDE SERPL-SCNC: 101 MMOL/L — SIGNIFICANT CHANGE UP (ref 96–108)
CO2 SERPL-SCNC: 24 MMOL/L — SIGNIFICANT CHANGE UP (ref 22–31)
CREAT SERPL-MCNC: 0.62 MG/DL — SIGNIFICANT CHANGE UP (ref 0.5–1.3)
GLUCOSE BLDC GLUCOMTR-MCNC: 180 MG/DL — HIGH (ref 70–99)
GLUCOSE BLDC GLUCOMTR-MCNC: 197 MG/DL — HIGH (ref 70–99)
GLUCOSE BLDC GLUCOMTR-MCNC: 204 MG/DL — HIGH (ref 70–99)
GLUCOSE BLDC GLUCOMTR-MCNC: 205 MG/DL — HIGH (ref 70–99)
GLUCOSE SERPL-MCNC: 208 MG/DL — HIGH (ref 70–99)
HCT VFR BLD CALC: 26.5 % — LOW (ref 39–50)
HGB BLD-MCNC: 8.4 G/DL — LOW (ref 13–17)
INR BLD: 1.69 — HIGH (ref 0.88–1.16)
MCHC RBC-ENTMCNC: 26.7 PG — LOW (ref 27–34)
MCHC RBC-ENTMCNC: 31.7 GM/DL — LOW (ref 32–36)
MCV RBC AUTO: 84.1 FL — SIGNIFICANT CHANGE UP (ref 80–100)
NRBC # BLD: 0 /100 WBCS — SIGNIFICANT CHANGE UP (ref 0–0)
PLATELET # BLD AUTO: 255 K/UL — SIGNIFICANT CHANGE UP (ref 150–400)
POTASSIUM SERPL-MCNC: 4.1 MMOL/L — SIGNIFICANT CHANGE UP (ref 3.5–5.3)
POTASSIUM SERPL-SCNC: 4.1 MMOL/L — SIGNIFICANT CHANGE UP (ref 3.5–5.3)
PROTHROM AB SERPL-ACNC: 19.8 SEC — HIGH (ref 10.6–13.6)
RBC # BLD: 3.15 M/UL — LOW (ref 4.2–5.8)
RBC # FLD: 14.2 % — SIGNIFICANT CHANGE UP (ref 10.3–14.5)
SODIUM SERPL-SCNC: 134 MMOL/L — LOW (ref 135–145)
WBC # BLD: 8.73 K/UL — SIGNIFICANT CHANGE UP (ref 3.8–10.5)
WBC # FLD AUTO: 8.73 K/UL — SIGNIFICANT CHANGE UP (ref 3.8–10.5)

## 2020-07-12 PROCEDURE — 99232 SBSQ HOSP IP/OBS MODERATE 35: CPT | Mod: GC

## 2020-07-12 NOTE — PROGRESS NOTE ADULT - SUBJECTIVE AND OBJECTIVE BOX
Patient is a 80y old  Male who presents with a chief complaint of Pulmonary embolsim (11 Jul 2020 17:13)      INTERVAL HPI/OVERNIGHT EVENTS:    Pt. seen and examined this morning  Pt. feels well, denies pain, no complaints    Review of Systems: 12 point review of systems otherwise negative    MEDICATIONS  (STANDING):  apixaban 10 milliGRAM(s) Oral every 12 hours  insulin lispro (HumaLOG) corrective regimen sliding scale   SubCutaneous Before meals and at bedtime  polyethylene glycol 3350 17 Gram(s) Oral every 24 hours  senna 2 Tablet(s) Oral at bedtime    MEDICATIONS  (PRN):      Allergies    No Known Allergies    Intolerances          Vital Signs Last 24 Hrs  T(C): 37.4 (12 Jul 2020 21:17), Max: 37.4 (12 Jul 2020 21:17)  T(F): 99.4 (12 Jul 2020 21:17), Max: 99.4 (12 Jul 2020 21:17)  HR: 81 (12 Jul 2020 21:17) (81 - 95)  BP: 127/71 (12 Jul 2020 21:17) (105/64 - 127/71)  BP(mean): --  RR: 18 (12 Jul 2020 21:17) (16 - 18)  SpO2: 90% (12 Jul 2020 21:17) (89% - 94%)  CAPILLARY BLOOD GLUCOSE      POCT Blood Glucose.: 204 mg/dL (12 Jul 2020 17:40)  POCT Blood Glucose.: 180 mg/dL (12 Jul 2020 12:38)  POCT Blood Glucose.: 205 mg/dL (12 Jul 2020 08:36)  POCT Blood Glucose.: 194 mg/dL (11 Jul 2020 22:10)      07-11 @ 07:01  -  07-12 @ 07:00  --------------------------------------------------------  IN: 57 mL / OUT: 0 mL / NET: 57 mL        Physical Exam:  (this morning)  Daily     Daily   General:  comfortable-appearing in NAD, +cachectic, eating breakfast  HEENT:  MMM  Abdomen:  soft, NT, less distended   Skin:  WWP  Neuro:  AAOx3    LABS:                        8.4    8.73  )-----------( 255      ( 12 Jul 2020 08:12 )             26.5     07-12    134<L>  |  101  |  13  ----------------------------<  208<H>  4.1   |  24  |  0.62    Ca    8.0<L>      12 Jul 2020 08:12  Phos  2.0     07-11  Mg     1.9     07-11    TPro  6.0  /  Alb  1.8<L>  /  TBili  0.3  /  DBili  x   /  AST  23  /  ALT  14  /  AlkPhos  166<H>  07-11    PT/INR - ( 12 Jul 2020 08:12 )   PT: 19.8 sec;   INR: 1.69          PTT - ( 12 Jul 2020 08:12 )  PTT:27.3 sec        RADIOLOGY & ADDITIONAL TESTS:    ---------------------------------------------------------------------------  I personally reviewed: [  ]EKG   [  ]CXR    [  ] CT    [  ]Other  ---------------------------------------------------------------------------  PLEASE CHECK WHEN PRESENT:     [  ]Heart Failure     [  ] Acute     [  ] Acute on Chronic     [  ] Chronic  -------------------------------------------------------------------     [  ]Diastolic [HFpEF]     [  ]Systolic [HFrEF]     [  ]Combined [HFpEF & HFrEF]     [  ]Other:  -------------------------------------------------------------------  [  ]BEKHA     [  ]ATN     [  ]Reneal Medullary Necrosis     [  ]Renal Cortical Necrosis     [  ]Other Pathological Lesions:    [  ]CKD 1  [  ]CKD 2  [  ]CKD 3  [  ]CKD 4  [  ]CKD 5  [  ]Other  -------------------------------------------------------------------  [  ]Other/Unspecified:    --------------------------------------------------------------------    Abdominal Nutritional Status  [  ]Malnutrition: See Nutrition Note  [  ]Cachexia  [  ]Other:   [  ]Supplement Ordered:  [  ]Morbid Obesity (BMI >=40]

## 2020-07-12 NOTE — PROGRESS NOTE ADULT - ASSESSMENT
Mom said that the pt has broken blood vessels in his eyes she believes it is from coughing.  Just monitor or bring him in to be seen?    Called to see if she wanted to come in to be seen since she also called yesterday about the ear not being better, but she said Dr Rodriguez had recommended not coming in for a while because of all the germs.  She would like to know if she can start the steroids that you gave her for the last cough? She doesn't have 5 days worth so she would just like your advice on what to do and also about the right eye having broken blood vessels. Please advise.   79 y/o M w/

## 2020-07-13 DIAGNOSIS — E11.40 TYPE 2 DIABETES MELLITUS WITH DIABETIC NEUROPATHY, UNSPECIFIED: ICD-10-CM

## 2020-07-13 DIAGNOSIS — E11.42 TYPE 2 DIABETES MELLITUS WITH DIABETIC POLYNEUROPATHY: ICD-10-CM

## 2020-07-13 DIAGNOSIS — E11.9 TYPE 2 DIABETES MELLITUS WITHOUT COMPLICATIONS: ICD-10-CM

## 2020-07-13 LAB
ANION GAP SERPL CALC-SCNC: 11 MMOL/L — SIGNIFICANT CHANGE UP (ref 5–17)
BUN SERPL-MCNC: 12 MG/DL — SIGNIFICANT CHANGE UP (ref 7–23)
CALCIUM SERPL-MCNC: 7.8 MG/DL — LOW (ref 8.4–10.5)
CHLORIDE SERPL-SCNC: 102 MMOL/L — SIGNIFICANT CHANGE UP (ref 96–108)
CO2 SERPL-SCNC: 23 MMOL/L — SIGNIFICANT CHANGE UP (ref 22–31)
CREAT SERPL-MCNC: 0.62 MG/DL — SIGNIFICANT CHANGE UP (ref 0.5–1.3)
GLUCOSE BLDC GLUCOMTR-MCNC: 186 MG/DL — HIGH (ref 70–99)
GLUCOSE BLDC GLUCOMTR-MCNC: 199 MG/DL — HIGH (ref 70–99)
GLUCOSE BLDC GLUCOMTR-MCNC: 235 MG/DL — HIGH (ref 70–99)
GLUCOSE BLDC GLUCOMTR-MCNC: 254 MG/DL — HIGH (ref 70–99)
GLUCOSE SERPL-MCNC: 169 MG/DL — HIGH (ref 70–99)
HCT VFR BLD CALC: 26.8 % — LOW (ref 39–50)
HGB BLD-MCNC: 8.5 G/DL — LOW (ref 13–17)
MAGNESIUM SERPL-MCNC: 1.8 MG/DL — SIGNIFICANT CHANGE UP (ref 1.6–2.6)
MCHC RBC-ENTMCNC: 26.6 PG — LOW (ref 27–34)
MCHC RBC-ENTMCNC: 31.7 GM/DL — LOW (ref 32–36)
MCV RBC AUTO: 83.8 FL — SIGNIFICANT CHANGE UP (ref 80–100)
NRBC # BLD: 0 /100 WBCS — SIGNIFICANT CHANGE UP (ref 0–0)
PLATELET # BLD AUTO: 280 K/UL — SIGNIFICANT CHANGE UP (ref 150–400)
POTASSIUM SERPL-MCNC: 4 MMOL/L — SIGNIFICANT CHANGE UP (ref 3.5–5.3)
POTASSIUM SERPL-SCNC: 4 MMOL/L — SIGNIFICANT CHANGE UP (ref 3.5–5.3)
RBC # BLD: 3.2 M/UL — LOW (ref 4.2–5.8)
RBC # FLD: 14.2 % — SIGNIFICANT CHANGE UP (ref 10.3–14.5)
SODIUM SERPL-SCNC: 136 MMOL/L — SIGNIFICANT CHANGE UP (ref 135–145)
WBC # BLD: 8.61 K/UL — SIGNIFICANT CHANGE UP (ref 3.8–10.5)
WBC # FLD AUTO: 8.61 K/UL — SIGNIFICANT CHANGE UP (ref 3.8–10.5)

## 2020-07-13 PROCEDURE — 99232 SBSQ HOSP IP/OBS MODERATE 35: CPT | Mod: GC

## 2020-07-13 PROCEDURE — 99358 PROLONG SERVICE W/O CONTACT: CPT | Mod: 25

## 2020-07-13 PROCEDURE — 99233 SBSQ HOSP IP/OBS HIGH 50: CPT

## 2020-07-13 RX ORDER — METFORMIN HYDROCHLORIDE 850 MG/1
1 TABLET ORAL
Qty: 0 | Refills: 0 | DISCHARGE

## 2020-07-13 RX ORDER — ATORVASTATIN CALCIUM 80 MG/1
1 TABLET, FILM COATED ORAL
Qty: 0 | Refills: 0 | DISCHARGE

## 2020-07-13 RX ORDER — ATORVASTATIN CALCIUM 80 MG/1
20 TABLET, FILM COATED ORAL AT BEDTIME
Refills: 0 | Status: DISCONTINUED | OUTPATIENT
Start: 2020-07-13 | End: 2020-07-14

## 2020-07-13 RX ORDER — GABAPENTIN 400 MG/1
300 CAPSULE ORAL
Refills: 0 | Status: DISCONTINUED | OUTPATIENT
Start: 2020-07-13 | End: 2020-07-14

## 2020-07-13 RX ORDER — ACETAMINOPHEN 500 MG
650 TABLET ORAL EVERY 6 HOURS
Refills: 0 | Status: DISCONTINUED | OUTPATIENT
Start: 2020-07-13 | End: 2020-07-14

## 2020-07-13 RX ORDER — GABAPENTIN 400 MG/1
1 CAPSULE ORAL
Qty: 0 | Refills: 0 | DISCHARGE

## 2020-07-13 RX ADMIN — GABAPENTIN 300 MILLIGRAM(S): 400 CAPSULE ORAL at 17:58

## 2020-07-13 RX ADMIN — ATORVASTATIN CALCIUM 20 MILLIGRAM(S): 80 TABLET, FILM COATED ORAL at 22:17

## 2020-07-13 NOTE — DISCHARGE NOTE PROVIDER - NSDCCAREPROVSEEN_GEN_ALL_CORE_FT
Connor Espinoza Connor Espinoza Bushra A  [facility] Palliative Care, Team  Gabriela Flores Hanna B

## 2020-07-13 NOTE — PROGRESS NOTE ADULT - ASSESSMENT
per Internal Medicine    79 y/o M w/    Problem/Plan - 1:  ·  Problem: Acute pulmonary embolism without acute cor pulmonale, unspecified pulmonary embolism type.  Plan: PESI Class 4; cont. A/C w/ DOAC (longterm).     Problem/Plan - 2:  ·  Problem: Other ascites.  Plan: s/p IR paracentesis, f/u fluid studies (suspect malignant).     Problem/Plan - 3:  ·  Problem: Abnormal MRI of abdomen.  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     Problem/Plan - 4:  ·  Problem: Severe protein-calorie malnutrition.  Plan: cont. Glucerna per Nutrition recs.

## 2020-07-13 NOTE — CHART NOTE - NSCHARTNOTEFT_GEN_A_CORE
PALLIATIVE MEDICINE COORDINATION OF CARE NOTE FOR SERGEY LUTHER    Patient last assessed: 7/10/2020 to manage: GOC/AD, Symptoms, and Support was recommended: Completed MOLST for DNR DNI. Patient wants [facility] then transition to LTC/NH.      30 Minutes; Start: 9:00am End: 9:30am , of non-face-to-face prolonged service provided that relates to (face-to-face) care that has or will occur and ongoing patient management, including one or more of the following:   - Reviewed records from other physicians or other health care professional services, including one or more of the following: other medical records and diagnostic / radiology study results     - Other: Medication reviewed.    The patient HAS used PRN's in the last 24h. Patient received 1 dose of Senna in the last 24h. MME: 0mg    MEDICATIONS  (STANDING):  apixaban 10 milliGRAM(s) Oral every 12 hours  insulin lispro (HumaLOG) corrective regimen sliding scale   SubCutaneous Before meals and at bedtime  polyethylene glycol 3350 17 Gram(s) Oral every 24 hours  senna 2 Tablet(s) Oral at bedtime    MEDICATIONS  (PRN):    - Other: Advanced directives     DNR DNI     Documented MOLST today and placed in paper chart.      No documented HCP form found on [facility]     Other surrogates: Brother Yunier Luther [phone]     No Living will / POA / Advance directives found on [place] / [facility].     No documented GOC notes on [facility]    - Other: Coordination/Plan of care     1 admissions in 1 year     Current admission LOS: 6 days     LACE score: 9     No PRN use over the weekend. Will reassess later today during bedside rounds.  Patient wants to go to [facility] in the [facility] close to his brother and then transition to long term care / nursing home.  Patient does not qualify for hospice services given wishes for [facility]/NH.  Patient does not qualify for inpatient hospice given no symptomatic needs.

## 2020-07-13 NOTE — DISCHARGE NOTE PROVIDER - CARE PROVIDERS DIRECT ADDRESSES
,[email or web address],DirectAddress_Unknown,DirectAddress_Unknown,DirectAddress_Unknown

## 2020-07-13 NOTE — DISCHARGE NOTE PROVIDER - PROVIDER TOKENS
PROVIDER:[TOKEN:[[id number]]],PROVIDER:[TOKEN:[[id number]]],PROVIDER:[TOKEN:[[id number]]],FREE:[LAST:[Antoni],FIRST:[Hill],PHONE:[phone]],FAX:[(   [phone]    -],ADDRESS:[[address] [place] [address]  (Make appointment here for follow up with PCP after discharge from subacute rehab)]]

## 2020-07-13 NOTE — PROGRESS NOTE ADULT - PROBLEM SELECTOR PLAN 2
Patient working with PT/OT today found that patient would benefit from 3-5x/week  Sub Acute Rehab  Patient states would like to go to [facility] [place] he [place] ready, which he states is not yet. Tolerating room air. No active symptoms. Currently on Eliquis BID.    Management as per primary team.

## 2020-07-13 NOTE — PROGRESS NOTE ADULT - PROBLEM SELECTOR PLAN 1
- B/l extensive PE, confirmed w/ CT. US Duplex: Acute DVTs involving b/l lower extremities.   - Continue with loading dose of Eliquis 10 mg BID for 7 days (7/11-)

## 2020-07-13 NOTE — DISCHARGE NOTE PROVIDER - NSDCCPCAREPLAN_GEN_ALL_CORE_FT
PRINCIPAL DISCHARGE DIAGNOSIS  Diagnosis: Other acute pulmonary embolism  Assessment and Plan of Treatment: Upon arrival to the hospital, it was found that you have blood clots in your lungs. We started you on a medication that is a blood thinner, in hopes to prevent further blood clots from forming. You need to continue the medication, Eliquis, upon discharge.      SECONDARY DISCHARGE DIAGNOSES  Diagnosis: Abnormal MRI of abdomen  Assessment and Plan of Treatment: There were findings of possible malignancy when we scanned your abdomen. In order to confirm this, we would need to obtain a biopsy. This possible malignancy could be the underlying cause of your blood clots, generalized weakness, and low blood count. If you wish get further care, or workup, please follow with both the Gastroenterologist, and Oncologist.    Diagnosis: Anemia, unspecified type  Assessment and Plan of Treatment: During your stay at the hospital, it was found that your blood cell count was low. We believe this is likely being caused by your malignancy. PRINCIPAL DISCHARGE DIAGNOSIS  Diagnosis: Acute pulmonary embolus  Assessment and Plan of Treatment: Upon arrival to the hospital, it was found that you have blood clotsin your lungs. We started you on a medication that is a blood thinner, in hopes to prevent further blood clots from forming. You need to continue the medication, Eliquis, upon discharge.      SECONDARY DISCHARGE DIAGNOSES  Diagnosis: Other ascites  Assessment and Plan of Treatment: You were found to have ascites, which in simple terms is fluid built up in your abdomen. We believe that this is caused by a possible malignancy. During your hospital stay, we took a sample of the fluid to further evaluate it. The pathology report is still pending, you can follow up with the results upon discharge. If you get abdominal pain from this, or any type of breathing discomfort, you can get outpatient therapeutic procedures to remove the fluid, in hopes to make you feel more comfortable. If you decide to follow up on your ascites, you can visit the Gastroenterologist for further treatment.    Diagnosis: Abnormal MRI of abdomen  Assessment and Plan of Treatment: There were findings of possible malignancy when we scanned your abdomen. In order to confirm this, we would need to obtain a biopsy. This possible malignancy could be the underlying cause of your blood clots, generalized weakness, and low blood count. If you wish get further care, or workup, please follow with both the Gastroenterologist, and Oncologist.

## 2020-07-13 NOTE — OCCUPATIONAL THERAPY INITIAL EVALUATION ADULT - MD ORDER
79 yo male pmhx DM2 noninsulin dependent bibems for acute onset PE. Endorses 20lb weight loss, and back pain occurring with stomach pain and now with cystic pancreatic mass and renal cyst with ascites and anemia.

## 2020-07-13 NOTE — PROGRESS NOTE ADULT - PROBLEM SELECTOR PLAN 1
Tolerating room air. No active symptoms. Currently on Eliquis BID.    Management as per primary team. Reports pins and needles on feet. Currently not on neuroleptics. Currently on Humalog. A1C with Estimated Average Glucose Result: 10.3 % (07.10.20 @ 07:35)    Recommend Neurontin 300mg PO qHS

## 2020-07-13 NOTE — PROGRESS NOTE ADULT - PROBLEM SELECTOR PLAN 9
F: None  E: replete k<4 mg<2  N: regular diet with glucerna   DVT Ppx - Eliquis 10 mg BID   DNR/DNI  Dispo - [facility] placement

## 2020-07-13 NOTE — DISCHARGE NOTE PROVIDER - CARE PROVIDER_API CALL
Milton Gomez  GASTROENTEROLOGY  [address] [address]  Phone: [phone]  Fax: [phone]  Follow Up Time:     Gabriela Flores  CRITICAL CARE MEDICINE  [address] [place] [address]  Phone: [phone]  Fax: [phone]  Follow Up Time:     Migue Kahn  INTERNAL MEDICINE  [address]  [place] [address]  Phone: [phone]  Fax: [phone]  Follow Up Time:     Liz Corrales  [address] [facility], [address]  [place] [address]  (Make appointment here for follow up with PCP after discharge from subacute rehab)  Phone: [phone]  Fax: (   )    -  Follow Up Time:

## 2020-07-13 NOTE — DISCHARGE NOTE PROVIDER - NSDCFUADDAPPT_GEN_ALL_CORE_FT
No appointments currently scheduled, as patient defers further follow up at this time. Although, please schedule an appointment with the listed providers (Gastroenterologist, Pulmonologist, and Hematologist/Oncologist) if you decide to get further workup and treatment.

## 2020-07-13 NOTE — PROGRESS NOTE ADULT - PROBLEM SELECTOR PROBLEM 7
Anemia, unspecified type
Nutrition, metabolism, and development symptoms
Severe protein-calorie malnutrition
Nutrition, metabolism, and development symptoms
Palliative care by specialist

## 2020-07-13 NOTE — DISCHARGE NOTE PROVIDER - NSDCMRMEDTOKEN_GEN_ALL_CORE_FT
metFORMIN 500 mg oral tablet: 1 tab(s) orally 2 times a day acetaminophen 325 mg oral tablet: 2 tab(s) orally every 6 hours, As needed, Mild Pain (1 - 3)  apixaban 5 mg oral tablet: 2 tab(s) orally every 12 hours    Currently on loading dose of Eliquis 10 mg q12 until 7/17/2020. SWITCH TO ELIQUIS 5 MG 1 tab orally q12 hours on 7/18/2020.  gabapentin 300 mg oral capsule: 1 cap(s) orally 2 times a day  glipiZIDE 10 mg oral tablet, extended release: 1 tab(s) orally once a day  Lipitor 20 mg oral tablet: 1 tab(s) orally once a day  metFORMIN 1000 mg oral tablet: 1 tab(s) orally 2 times a day  polyethylene glycol 3350 oral powder for reconstitution: 17 gram(s) orally every 24 hours  senna oral tablet: 2 tab(s) orally once a day (at bedtime)

## 2020-07-13 NOTE — PROGRESS NOTE ADULT - PROBLEM SELECTOR PROBLEM 5
Goals of care, counseling/discussion Medical orders for life-sustaining treatment (MOLST) form in chart

## 2020-07-13 NOTE — PROGRESS NOTE ADULT - PROBLEM SELECTOR PLAN 8
- Nutrition following; will add glucerna x3/day as oral nutrition supplements.
F: Albumin post paracentesis   E: replete k<4 mg<2  N: regular diet   DVT Ppx - Heparin drip  Full Code   GMF

## 2020-07-13 NOTE — PROGRESS NOTE ADULT - PROBLEM SELECTOR PLAN 2
- s/p MRCP - findings concerning for malignancy.   - Diagnostic paracentesis on 7/9/2020; f/u pending pathology report.   - Pt currently deferring workup - can f/u with GI as outpatient to get EUS w/ FNA if he changes his decision.  - Deferring conversation with Heme/Onc team for now. Can discuss as outpatient when pt feels ready.   - Medically clear for discharge - pending placement at [facility] and then possibly transition to long-term care.

## 2020-07-13 NOTE — PROGRESS NOTE ADULT - ASSESSMENT
81 yo male pmhx DM2 noninsulin dependent bibems for acute onset PE with finding of malignant pancreatic cancer on MRCP.

## 2020-07-13 NOTE — OCCUPATIONAL THERAPY INITIAL EVALUATION ADULT - GROOMING, PREVIOUS LEVEL OF FUNCTION, OT EVAL
Emi Forbesu presents for lab draw ordered by Tommie Rubi RN MSN [id number]-NP    The following labs were drawn and sent to lab by Robbi Salas:    CMP, HgA1C and lipid panel, urine microablumin    The following tubes were sent:    Brooke Paniagua  ( 1) and Brain (1)    Patient tolerated procedure well, blood obtained via venipuncture to right antecubital independent

## 2020-07-13 NOTE — PROGRESS NOTE ADULT - ASSESSMENT
79 yo male pmhx DM2 noninsulin dependent bibems for acute onset profound weakness. Pt states he could not support himself last night and guided himself to the floor from which he could not stand up. He states he was on floor approximately 6-7 hours and was finally able to crawl to phone and call 911. Pt also endorses a back pain for 2 months that progressed to a back and stomach pain (does not endorse radiation). Simultaneously he has also noticed that he has decreased energy. Over the course of 3months 20lb weight loss with no change in appetite He denies cp/fever/cough/urinary incontinence/fever/chills. Pt endorses decreased appetite over past 2 months and abdominal distension x 2 months. Denies cp/soa. Denies black/bloody stools. (07 Jul 2020 13:53)

## 2020-07-13 NOTE — PROGRESS NOTE ADULT - PROBLEM SELECTOR PLAN 3
- s/p diagnostic paracentesis on 7/9/2020; f/u pending pathology report.  - Will monitor by routine abdominal exam; may need therapeutic paracentesis before d/c.

## 2020-07-13 NOTE — PROGRESS NOTE ADULT - SUBJECTIVE AND OBJECTIVE BOX
Physical Medicine and Rehabilitation Progress Note:    Patient is a 80y old  Male who presents with a chief complaint of Pulmonary embolsim (13 Jul 2020 11:36)      HPI:  81 yo male pmhx DM2 noninsulin dependent bibems for acute onset profound weakness. Pt states he could not support himself last night and guided himself to the floor from which he could not stand up. He states he was on floor approximately 6-7 hours and was finally able to crawl to phone and call 911. Pt also endorses a back pain for 2 months that progressed to a back and stomach pain (does not endorse radiation). Simultaneously he has also noticed that he has decreased energy. Over the course of 3months 20lb weight loss with no change in appetite He denies cp/fever/cough/urinary incontinence/fever/chills. Pt endorses decreased appetite over past 2 months and abdominal distension x 2 months. Denies cp/soa. Denies black/bloody stools. (07 Jul 2020 13:53)                            8.5    8.61  )-----------( 280      ( 13 Jul 2020 06:27 )             26.8       07-13    136  |  102  |  12  ----------------------------<  169<H>  4.0   |  23  |  0.62    Ca    7.8<L>      13 Jul 2020 06:27  Mg     1.8     07-13      Vital Signs Last 24 Hrs  T(C): 36.7 (13 Jul 2020 08:42), Max: 37.4 (12 Jul 2020 21:17)  T(F): 98.1 (13 Jul 2020 08:42), Max: 99.4 (12 Jul 2020 21:17)  HR: 87 (13 Jul 2020 08:55) (81 - 88)  BP: 92/54 (13 Jul 2020 09:00) (92/54 - 127/71)  BP(mean): --  RR: 18 (13 Jul 2020 08:42) (17 - 18)  SpO2: 91% (13 Jul 2020 09:00) (90% - 94%)    MEDICATIONS  (STANDING):  apixaban 10 milliGRAM(s) Oral every 12 hours  insulin lispro (HumaLOG) corrective regimen sliding scale   SubCutaneous Before meals and at bedtime  polyethylene glycol 3350 17 Gram(s) Oral every 24 hours  senna 2 Tablet(s) Oral at bedtime    MEDICATIONS  (PRN):    Currently Undergoing Physical/ Occupational Therapy at bedside.    Functional Status Assessment:         Cognitive/Perceptual/Neuro  Cognitive/Neuro/Behavioral [WDL Definition: Alert; opens eyes spontaneously; arouses to voice or touch; oriented x 4; follows commands; speech spontaneous, logical; purposeful motor response; behavior appropriate to situation]: WDL    Therapeutic Interventions      Bed Mobility  Bed Mobility Training Supine-to-Sit: minimum assist (75% patient effort);  moderate assist (50% patient effort);  1 person assist;  verbal cues  Bed Mobility Training Limitations: decreased ability to use arms for pushing/pulling;  decreased ability to use legs for bridging/pushing    Sit-Stand Transfer Training  Transfer Training Sit-to-Stand Transfer: minimum assist (75% patient effort);  moderate assist (50% patient effort);  1 person assist;  verbal cues;  weight-bearing as tolerated   rolling walker  Transfer Training Stand-to-Sit Transfer: contact guard;  verbal cues;  1 person assist;  weight-bearing as tolerated   rolling walker  Sit-to-Stand Transfer Training Transfer Safety Analysis: impaired balance;  decreased strength;  rolling walker    Gait Training  Gait Training Symptoms Noted During/After Treatment: fatigue  Gait Training: contact guard;  verbal cues;  1 person assist;  weight-bearing as tolerated   rolling walker;  7 feet  Gait Analysis: 3-point gait   decreased mirza;  decreased step length;  decreased stride length;  decreased hip/knee flexion;  impaired balance;  decreased strength;  7 feet;  rolling walker  Gait Number of Times:: x 2  Type of Rest Type of Rest: sitting    Therapeutic Exercise  Therapeutic Exercise Detail: In supine bilateral ankle DF/PF, heel slides x 10 reps eachIn sitting on EOB marching in place x 10 reps           PM&R Impression: as above    Current Disposition Plan Recommendations:    subacute rehab placement

## 2020-07-13 NOTE — PROGRESS NOTE ADULT - PROBLEM SELECTOR PLAN 2
s/p IR paracentesis, f/u fluid studies (suspect malignant); monitor abdominal exam, may need PRN therapeutic taps

## 2020-07-13 NOTE — PROGRESS NOTE ADULT - SUBJECTIVE AND OBJECTIVE BOX
OVERNIGHT EVENTS: No overnight events.     SUBJECTIVE / INTERVAL HPI: Patient seen and examined at bedside. Denies SOB, chest pain, palpitations, abdominal pain, N/V/D. Admits to b/l lower extremity pain, describes it as burning. Continues to defer speaking about his possible malignancy.     VITAL SIGNS:  Vital Signs Last 24 Hrs  T(C): 36.7 (13 Jul 2020 08:42), Max: 37.4 (12 Jul 2020 21:17)  T(F): 98.1 (13 Jul 2020 08:42), Max: 99.4 (12 Jul 2020 21:17)  HR: 87 (13 Jul 2020 08:55) (81 - 88)  BP: 92/54 (13 Jul 2020 09:00) (92/54 - 127/71)  BP(mean): --  RR: 18 (13 Jul 2020 08:42) (17 - 18)  SpO2: 91% (13 Jul 2020 09:00) (90% - 94%)    PHYSICAL EXAM:    General: Cachectic, No acute distress  HEENT: NC/AT; PERRL, anicteric sclera  Cardiovascular: +S1/S2, RRR, no murmurs, rubs, gallops  Respiratory: CTA B/L; no W/R/R  Gastrointestinal: Distended (increased), non-tender, no guarding,+BSx4  Extremities: trace bilateral ankle edema   Neurological: AAOx3; no focal deficits    MEDICATIONS:  MEDICATIONS  (STANDING):  apixaban 10 milliGRAM(s) Oral every 12 hours  atorvastatin 20 milliGRAM(s) Oral at bedtime  gabapentin 300 milliGRAM(s) Oral two times a day  insulin lispro (HumaLOG) corrective regimen sliding scale   SubCutaneous Before meals and at bedtime  polyethylene glycol 3350 17 Gram(s) Oral every 24 hours  senna 2 Tablet(s) Oral at bedtime    MEDICATIONS  (PRN):  acetaminophen   Tablet .. 650 milliGRAM(s) Oral every 6 hours PRN Mild Pain (1 - 3)      ALLERGIES:  Allergies    No Known Allergies    Intolerances        LABS:                        8.5    8.61  )-----------( 280      ( 13 Jul 2020 06:27 )             26.8     07-13    136  |  102  |  12  ----------------------------<  169<H>  4.0   |  23  |  0.62    Ca    7.8<L>      13 Jul 2020 06:27  Mg     1.8     07-13      PT/INR - ( 12 Jul 2020 08:12 )   PT: 19.8 sec;   INR: 1.69          PTT - ( 12 Jul 2020 08:12 )  PTT:27.3 sec    CAPILLARY BLOOD GLUCOSE      POCT Blood Glucose.: 186 mg/dL (13 Jul 2020 11:59)      RADIOLOGY & ADDITIONAL TESTS: Reviewed.

## 2020-07-13 NOTE — PROGRESS NOTE ADULT - PROBLEM SELECTOR PROBLEM 1
Multiple subsegmental pulmonary emboli without acute cor pulmonale Diabetic polyneuropathy associated with type 2 diabetes mellitus

## 2020-07-13 NOTE — DISCHARGE NOTE PROVIDER - HOSPITAL COURSE
#Discharge: do not delete        Patient is 81 yo M/F with past medical history of non- insulin-dependent diabetes mellitus (Type ll)     Presented with profound , found to have acute extensive b/l pulmonary emboli.     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 Started patient on a heparin drip and was admitted to the telemetry unit, then stepped down to RMF the next day. Abnormal findings on CT abdomen in the setting of ascites warranted MRCP, which was done and concerning for possible form of IPMN-intraductal papillary mucinous neoplasm. Diagnostic paracentesis was done by IR - cytology is consistent with malignancy, although pathology report is still pending. Palliative team, GI, Heme/Onc were following - patient deferred the conversation for now with Heme/Onc about possible diagnosis of malignancy. GI said pt can consider EUS w/FNA to obtain dx, however, it's ultimately unlikely to . After speaking with Palliative, pt signed to be DNR/DNI, and wishes to be discharged to either [facility] or a long-term nursing home residence. PT evaluated - recommended [facility].  Patient is medically stable and ready for discharge to [facility].         New medications: Eliquis 10 mg BID for 7 days (7/11-), then transition to Eliquis 5 mg BID     Labs to be followed outpatient: IR Pathology Report     Exam to be followed outpatient: None #Discharge: do not delete        Patient is 81 yo M/F with past medical history of non- insulin-dependent diabetes mellitus (Type ll)     Presented with profound , found to have acute extensive b/l pulmonary emboli.     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 Started patient on a heparin drip and was admitted to the telemetry unit, then stepped down to RMF the next day. Abnormal findings on CT abdomen in the setting of ascites warranted MRCP, which was done and concerning for possible form of IPMN-intraductal papillary mucinous neoplasm. Diagnostic paracentesis was done by IR - cytology is consistent with malignancy, although pathology report is still pending. Palliative team, GI, Heme/Onc were following - patient deferred the conversation for now with Heme/Onc about possible diagnosis of malignancy. GI said pt can consider EUS w/FNA to obtain dx, however, it's ultimately unlikely to . We switched from Heparin to ELiquis. After speaking with Palliative, pt signed to be DNR/DNI, and wishes to be discharged to either [facility] or a long-term nursing home residence. PT evaluated - recommended [facility].  Patient is medically stable and ready for discharge to [facility].         New medications: Eliquis 10 mg BID for 7 days (7/11-), then transition to Eliquis 5 mg BID     Labs to be followed outpatient: IR Paracentesis Pathology Report     Exam to be followed outpatient: None - If patient wishes to further follow up on abnormal MRI findings of abdomen, can follow up with GI as outpatient for EUS w/ FNA. Patient is 79 yo M/F with past medical history of non- insulin-dependent diabetes mellitus (Type ll)     Presented with profound , found to have acute extensive b/l pulmonary emboli.     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 Started patient on a heparin drip and was admitted to the telemetry unit, then stepped down to RMF the next day. Abnormal findings on CT abdomen in the setting of ascites warranted MRCP, which was done and concerning for possible form of IPMN-intraductal papillary mucinous neoplasm. Diagnostic paracentesis was done by IR - cytology is consistent with malignancy, although pathology report is still pending. Palliative team, GI, Heme/Onc were following - patient deferred the conversation for now with Heme/Onc about possible diagnosis of malignancy. GI said pt can consider EUS w/FNA to obtain dx, however, it's ultimately unlikely to . We switched from Heparin to ELiquis. After speaking with Palliative, pt signed to be DNR/DNI, and wishes to be discharged to either [facility] or a long-term nursing home residence. PT evaluated - recommended [facility].  Patient is medically stable and ready for discharge to [facility].         It should be noted that the attending Dr. Espinoza, residents, as well as heme/onc team have all discussed the need for further workup and treatment, or for a definitive decision regarding no treatment, with all providers stating advanced cancer as most likely dx. Palliative care saw him; while he was not ready consider hospice, he did make himself DNR/DNI. However, he has not yet been ready to take in a full conversation of the details, has deferred biopsy, and declined to have follow-up scheduled as of the time of his discharge from [facility]  The patient currently states he wants no treatment or follow up but he was previously healthy and his diagnosis is still new to him, so he is still having an expected physiologic emotional reaction to his new diagnosis, and may want to pursue definitive diagnosis at a later date. Given his likely metastatic malignancy based on multiple imaging modalities, supported by bilateral PE and DVT without precipitating factor, it should be noted that only palliative therapy, and not definitive treatment, would be within the scope of a reasonable practitioner of evidence-based medicine. He is being given contact info for all his specialists as well as primary care to reach out while he is at [facility], or to establish appointments for after [facility]; in addition, his specialists, especially heme/onc, have been made aware of his discharge plan and will reach out to him in a few weeks after discharge to offer their services where they deem appropriate.         New medications: Eliquis 10 mg BID for 7 days (7/11-7/19), then transition to Eliquis 5 mg BID (7/20 - )      Labs to be followed outpatient: IR Paracentesis Pathology Report     Exam to be followed outpatient: None - If patient wishes to further follow up on abnormal MRI findings of abdomen, can follow up with GI as outpatient for EUS w/ FNA.

## 2020-07-13 NOTE — PROGRESS NOTE ADULT - PROBLEM SELECTOR PLAN 3
DNR DNI Patient working with PT/OT today found that patient would benefit from 3-5x/week  Sub Acute Rehab  Patient states would like to go to [facility] [place] he [place] ready, which he states is not yet.

## 2020-07-13 NOTE — DISCHARGE NOTE PROVIDER - NSDCCPTREATMENT_GEN_ALL_CORE_FT
PRINCIPAL PROCEDURE  Procedure: Paracentesis, abdominal, diagnostic, initial  Findings and Treatment: During your stay, we took a sample of fluid from your abdominal to further evaluate for possible malignacy. The pathology report is still pending - you should follow up with this during your follow up appointment. PRINCIPAL PROCEDURE  Procedure: Paracentesis, abdominal, diagnostic, initial  Findings and Treatment: During your stay, we took a sample of fluid from your abdominal to further evaluate for possible malignacy. The pathology report is still pending - you should follow up with the results upon discharge.

## 2020-07-13 NOTE — PROGRESS NOTE ADULT - SUBJECTIVE AND OBJECTIVE BOX
SERGEY LUTHER             MRN-[id number]    CC: Debility, GOC/AD, Support    HPI:  81 yo male pmhx DM2 noninsulin dependent bibems for acute onset profound weakness. Pt states he could not support himself last night and guided himself to the floor from which he could not stand up. He states he was on floor approximately 6-7 hours and was finally able to crawl to phone and call 911. Pt also endorses a back pain for 2 months that progressed to a back and stomach pain (does not endorse radiation). Simultaneously he has also noticed that he has decreased energy. Over the course of 3months 20lb weight loss with no change in appetite He denies cp/fever/cough/urinary incontinence/fever/chills. Pt endorses decreased appetite over past 2 months and abdominal distension x 2 months. Denies cp/soa. Denies black/bloody stools. (07 Jul 2020 13:53)    SUBJECTIVE: Saw and evaluated Mr Luther at bedside. He was found sitting in a bedside chair stating he had recently worked with PT for the first time and felt weak and also complained of pins and needle sensations in his feet. No other complaints.     ROS:  Dyspnea (Edwige 0-10): 0                       N/V (Y/N):  No                            Secretions (Y/N) : No                Agitation(Y/N): No  Pain (Y/N): No      -Provocation/Palliation: N/A  -Quality/Quantity: N/A  -Radiating: N/A  -Severity: No pain  -Timing/Frequency: N/A  -Impact on ADLs: N/A    OTHER REVIEW OF SYSTEMS: Feet pins and needles.    PEx:  T(C): 36.7 (07-13-20 @ 08:42), Max: 37.4 (07-12-20 @ 21:17)  HR: 87 (07-13-20 @ 08:55) (81 - 88)  BP: 92/54 (07-13-20 @ 09:00) (92/54 - 127/71)  RR: 18 (07-13-20 @ 08:42) (17 - 18)  SpO2: 91% (07-13-20 @ 09:00) (90% - 94%)  Wt(kg):  74.8Kg    General: AAOx4 Elderly thin man found in bedside chair watching TV in NAD  HEENT: Temporal wasting PERRL EOMI Non icteric MOM   Neck: Supple No masses  CVS: RR S1S2 No M/G/R  Resp: Unlabored Non tachypneic CTAB  GI: Soft NT ND BS+   : Voiding freely  Musc: Generalized muscle wasting with decreased strength B/L LE  Neuro: Follows commands No focal deficits  Psych: Calm   Skin: Xerosis Non jaundiced  Lymph: Normal    ALLERGIES: No Known Allergies    OPIATE NAÏVE (Y/N): Yes    MEDICATIONS: REVIEWED  MEDICATIONS  (STANDING):  apixaban 10 milliGRAM(s) Oral every 12 hours  insulin lispro (HumaLOG) corrective regimen sliding scale   SubCutaneous Before meals and at bedtime  polyethylene glycol 3350 17 Gram(s) Oral every 24 hours  senna 2 Tablet(s) Oral at bedtime    MEDICATIONS  (PRN):    LABS: REVIEWED  CBC:                        8.5    8.61  )-----------( 280      ( 13 Jul 2020 06:27 )             26.8     CMP:    07-13    136  |  102  |  12  ----------------------------<  169<H>  4.0   |  23  |  0.62    Ca    7.8<L>      13 Jul 2020 06:27  Mg     1.8     07-13    Albumin, Serum: 1.8 g/dL (07-11-20 @ 07:14)    POCT Blood Glucose.: 199: Notified RN mg/dL (07.13.20 @ 08:23)    A1C with Estimated Average Glucose Result: 10.3 % (07.10.20 @ 07:35)    IMAGING: REVIEWED    Advanced Directives:     DNR DNI     Documented MOLST today and placed in paper chart.      No documented HCP form found on [facility]     Other surrogates: Brother Yunier Luther [phone]     No Living will / POA / Advance directives found on [place] / [facility].     No documented GOC notes on [facility]    Decision maker: The patient is able to have complex medical decision making conversations.  Legal surrogate: No documented HCP form found on [facility], but voiced wanting his brother to help him with decisions; Brother Yunier Luther [phone]     GOALS OF CARE DISCUSSION       Palliative care info/counseling provided	           Advanced Directives addressed please see Advance Care Planning Note	           Documentation of GOC: DNR DNI  Wants to go to [facility] and transition to long term nursing home residence. Does not want to return to his apartment. Would like to talk to oncologist about treatment they would offer.     REFERRALS	        Unit SW/Case Mgmt        - Declined       Hospice - Does not qualify       Nutrition - Following       PT/OT - Following

## 2020-07-13 NOTE — PROGRESS NOTE ADULT - PROBLEM SELECTOR PLAN 5
Plan for [facility] and transition to long term nursing home. Patient would not qualify for referral to hospice at this time given these wishes. He states he does not want to return home. ALYSA completed and placed in paper chart.

## 2020-07-13 NOTE — PROGRESS NOTE ADULT - PROBLEM SELECTOR PLAN 6
Palliative medicine will sign off for now. Please reconsult if the patients symptoms change and need to be reassessed, the patients goals of care need to be readdressed based on clinical changes, or if patient is readmitted in the future. Plan for [facility] and transition to long term nursing home. Patient would not qualify for referral to hospice at this time given these wishes. He states he does not want to return home.

## 2020-07-13 NOTE — PROGRESS NOTE ADULT - SUBJECTIVE AND OBJECTIVE BOX
Patient is a 80y old  Male who presents with a chief complaint of Pulmonary embolism (13 Jul 2020 13:17)      INTERVAL HPI/OVERNIGHT EVENTS:    Pt. seen and examined this morning  Pt. feels well, denies CP, SOB, N/V, abdominal pain  Tolerating PO    Review of Systems: 12 point review of systems otherwise negative    MEDICATIONS  (STANDING):  apixaban 10 milliGRAM(s) Oral every 12 hours  atorvastatin 20 milliGRAM(s) Oral at bedtime  gabapentin 300 milliGRAM(s) Oral two times a day  insulin lispro (HumaLOG) corrective regimen sliding scale   SubCutaneous Before meals and at bedtime  polyethylene glycol 3350 17 Gram(s) Oral every 24 hours  senna 2 Tablet(s) Oral at bedtime    MEDICATIONS  (PRN):  acetaminophen   Tablet .. 650 milliGRAM(s) Oral every 6 hours PRN Mild Pain (1 - 3)      Allergies    No Known Allergies    Intolerances          Vital Signs Last 24 Hrs  T(C): 36.7 (13 Jul 2020 08:42), Max: 37.4 (12 Jul 2020 21:17)  T(F): 98.1 (13 Jul 2020 08:42), Max: 99.4 (12 Jul 2020 21:17)  HR: 87 (13 Jul 2020 08:55) (81 - 88)  BP: 92/54 (13 Jul 2020 09:00) (92/54 - 127/71)  BP(mean): --  RR: 18 (13 Jul 2020 08:42) (17 - 18)  SpO2: 91% (13 Jul 2020 09:00) (90% - 94%)  CAPILLARY BLOOD GLUCOSE      POCT Blood Glucose.: 186 mg/dL (13 Jul 2020 11:59)  POCT Blood Glucose.: 199 mg/dL (13 Jul 2020 08:23)  POCT Blood Glucose.: 197 mg/dL (12 Jul 2020 22:05)  POCT Blood Glucose.: 204 mg/dL (12 Jul 2020 17:40)        Physical Exam:  (this morning)  Daily     Daily   General:  comfortable-appearing in NAD, +cachectic  HEENT:  MMM  Abdomen:  soft, NT, more distended   Skin:  WWP  Neuro:  AAOx3    LABS:                        8.5    8.61  )-----------( 280      ( 13 Jul 2020 06:27 )             26.8     07-13    136  |  102  |  12  ----------------------------<  169<H>  4.0   |  23  |  0.62    Ca    7.8<L>      13 Jul 2020 06:27  Mg     1.8     07-13      PT/INR - ( 12 Jul 2020 08:12 )   PT: 19.8 sec;   INR: 1.69          PTT - ( 12 Jul 2020 08:12 )  PTT:27.3 sec        RADIOLOGY & ADDITIONAL TESTS:    ---------------------------------------------------------------------------  I personally reviewed: [  ]EKG   [  ]CXR    [  ] CT    [  ]Other  ---------------------------------------------------------------------------  PLEASE CHECK WHEN PRESENT:     [  ]Heart Failure     [  ] Acute     [  ] Acute on Chronic     [  ] Chronic  -------------------------------------------------------------------     [  ]Diastolic [HFpEF]     [  ]Systolic [HFrEF]     [  ]Combined [HFpEF & HFrEF]     [  ]Other:  -------------------------------------------------------------------  [  ]BEKAH     [  ]ATN     [  ]Reneal Medullary Necrosis     [  ]Renal Cortical Necrosis     [  ]Other Pathological Lesions:    [  ]CKD 1  [  ]CKD 2  [  ]CKD 3  [  ]CKD 4  [  ]CKD 5  [  ]Other  -------------------------------------------------------------------  [  ]Other/Unspecified:    --------------------------------------------------------------------    Abdominal Nutritional Status  [  ]Malnutrition: See Nutrition Note  [  ]Cachexia  [  ]Other:   [  ]Supplement Ordered:  [  ]Morbid Obesity (BMI >=40]

## 2020-07-14 VITALS
TEMPERATURE: 98 F | DIASTOLIC BLOOD PRESSURE: 74 MMHG | SYSTOLIC BLOOD PRESSURE: 125 MMHG | RESPIRATION RATE: 18 BRPM | OXYGEN SATURATION: 91 % | HEART RATE: 83 BPM

## 2020-07-14 DIAGNOSIS — D72.829 ELEVATED WHITE BLOOD CELL COUNT, UNSPECIFIED: ICD-10-CM

## 2020-07-14 DIAGNOSIS — D64.9 ANEMIA, UNSPECIFIED: ICD-10-CM

## 2020-07-14 LAB
ALBUMIN SERPL ELPH-MCNC: 1.7 G/DL — LOW (ref 3.3–5)
ALP SERPL-CCNC: 151 U/L — HIGH (ref 40–120)
ALT FLD-CCNC: 15 U/L — SIGNIFICANT CHANGE UP (ref 10–45)
ANION GAP SERPL CALC-SCNC: 10 MMOL/L — SIGNIFICANT CHANGE UP (ref 5–17)
AST SERPL-CCNC: 25 U/L — SIGNIFICANT CHANGE UP (ref 10–40)
BASOPHILS # BLD AUTO: 0.01 K/UL — SIGNIFICANT CHANGE UP (ref 0–0.2)
BASOPHILS NFR BLD AUTO: 0.1 % — SIGNIFICANT CHANGE UP (ref 0–2)
BILIRUB SERPL-MCNC: 0.5 MG/DL — SIGNIFICANT CHANGE UP (ref 0.2–1.2)
BUN SERPL-MCNC: 13 MG/DL — SIGNIFICANT CHANGE UP (ref 7–23)
CALCIUM SERPL-MCNC: 7.6 MG/DL — LOW (ref 8.4–10.5)
CHLORIDE SERPL-SCNC: 102 MMOL/L — SIGNIFICANT CHANGE UP (ref 96–108)
CO2 SERPL-SCNC: 24 MMOL/L — SIGNIFICANT CHANGE UP (ref 22–31)
CREAT SERPL-MCNC: 0.62 MG/DL — SIGNIFICANT CHANGE UP (ref 0.5–1.3)
EOSINOPHIL # BLD AUTO: 0.01 K/UL — SIGNIFICANT CHANGE UP (ref 0–0.5)
EOSINOPHIL NFR BLD AUTO: 0.1 % — SIGNIFICANT CHANGE UP (ref 0–6)
GLUCOSE BLDC GLUCOMTR-MCNC: 217 MG/DL — HIGH (ref 70–99)
GLUCOSE BLDC GLUCOMTR-MCNC: 242 MG/DL — HIGH (ref 70–99)
GLUCOSE BLDC GLUCOMTR-MCNC: 317 MG/DL — HIGH (ref 70–99)
GLUCOSE SERPL-MCNC: 213 MG/DL — HIGH (ref 70–99)
HCT VFR BLD CALC: 25.7 % — LOW (ref 39–50)
HGB BLD-MCNC: 8.2 G/DL — LOW (ref 13–17)
IMM GRANULOCYTES NFR BLD AUTO: 0.7 % — SIGNIFICANT CHANGE UP (ref 0–1.5)
LYMPHOCYTES # BLD AUTO: 0.83 K/UL — LOW (ref 1–3.3)
LYMPHOCYTES # BLD AUTO: 6.1 % — LOW (ref 13–44)
MAGNESIUM SERPL-MCNC: 2 MG/DL — SIGNIFICANT CHANGE UP (ref 1.6–2.6)
MCHC RBC-ENTMCNC: 26.5 PG — LOW (ref 27–34)
MCHC RBC-ENTMCNC: 31.9 GM/DL — LOW (ref 32–36)
MCV RBC AUTO: 83.2 FL — SIGNIFICANT CHANGE UP (ref 80–100)
MONOCYTES # BLD AUTO: 0.72 K/UL — SIGNIFICANT CHANGE UP (ref 0–0.9)
MONOCYTES NFR BLD AUTO: 5.3 % — SIGNIFICANT CHANGE UP (ref 2–14)
NEUTROPHILS # BLD AUTO: 11.98 K/UL — HIGH (ref 1.8–7.4)
NEUTROPHILS NFR BLD AUTO: 87.7 % — HIGH (ref 43–77)
NON-GYNECOLOGICAL CYTOLOGY STUDY: SIGNIFICANT CHANGE UP
NRBC # BLD: 0 /100 WBCS — SIGNIFICANT CHANGE UP (ref 0–0)
PHOSPHATE SERPL-MCNC: 2.4 MG/DL — LOW (ref 2.5–4.5)
PLATELET # BLD AUTO: 291 K/UL — SIGNIFICANT CHANGE UP (ref 150–400)
POTASSIUM SERPL-MCNC: 4.2 MMOL/L — SIGNIFICANT CHANGE UP (ref 3.5–5.3)
POTASSIUM SERPL-SCNC: 4.2 MMOL/L — SIGNIFICANT CHANGE UP (ref 3.5–5.3)
PROT SERPL-MCNC: 6 G/DL — SIGNIFICANT CHANGE UP (ref 6–8.3)
RBC # BLD: 3.09 M/UL — LOW (ref 4.2–5.8)
RBC # FLD: 14.6 % — HIGH (ref 10.3–14.5)
SODIUM SERPL-SCNC: 136 MMOL/L — SIGNIFICANT CHANGE UP (ref 135–145)
WBC # BLD: 13.76 K/UL — HIGH (ref 3.8–10.5)
WBC # FLD AUTO: 13.76 K/UL — HIGH (ref 3.8–10.5)

## 2020-07-14 PROCEDURE — 97116 GAIT TRAINING THERAPY: CPT

## 2020-07-14 PROCEDURE — 49083 ABD PARACENTESIS W/IMAGING: CPT

## 2020-07-14 PROCEDURE — 70450 CT HEAD/BRAIN W/O DYE: CPT

## 2020-07-14 PROCEDURE — 96374 THER/PROPH/DIAG INJ IV PUSH: CPT | Mod: XU

## 2020-07-14 PROCEDURE — 82607 VITAMIN B-12: CPT

## 2020-07-14 PROCEDURE — 80048 BASIC METABOLIC PNL TOTAL CA: CPT

## 2020-07-14 PROCEDURE — 96375 TX/PRO/DX INJ NEW DRUG ADDON: CPT

## 2020-07-14 PROCEDURE — 89051 BODY FLUID CELL COUNT: CPT

## 2020-07-14 PROCEDURE — 82945 GLUCOSE OTHER FLUID: CPT

## 2020-07-14 PROCEDURE — 84484 ASSAY OF TROPONIN QUANT: CPT

## 2020-07-14 PROCEDURE — 82140 ASSAY OF AMMONIA: CPT

## 2020-07-14 PROCEDURE — 74183 MRI ABD W/O CNTR FLWD CNTR: CPT

## 2020-07-14 PROCEDURE — 36415 COLL VENOUS BLD VENIPUNCTURE: CPT

## 2020-07-14 PROCEDURE — 86850 RBC ANTIBODY SCREEN: CPT

## 2020-07-14 PROCEDURE — 93005 ELECTROCARDIOGRAM TRACING: CPT

## 2020-07-14 PROCEDURE — 99285 EMERGENCY DEPT VISIT HI MDM: CPT | Mod: 25

## 2020-07-14 PROCEDURE — 93306 TTE W/DOPPLER COMPLETE: CPT

## 2020-07-14 PROCEDURE — 86901 BLOOD TYPING SEROLOGIC RH(D): CPT

## 2020-07-14 PROCEDURE — 80307 DRUG TEST PRSMV CHEM ANLYZR: CPT

## 2020-07-14 PROCEDURE — 80053 COMPREHEN METABOLIC PANEL: CPT

## 2020-07-14 PROCEDURE — 99358 PROLONG SERVICE W/O CONTACT: CPT

## 2020-07-14 PROCEDURE — 83036 HEMOGLOBIN GLYCOSYLATED A1C: CPT

## 2020-07-14 PROCEDURE — 83690 ASSAY OF LIPASE: CPT

## 2020-07-14 PROCEDURE — 85730 THROMBOPLASTIN TIME PARTIAL: CPT

## 2020-07-14 PROCEDURE — 83540 ASSAY OF IRON: CPT

## 2020-07-14 PROCEDURE — 82550 ASSAY OF CK (CPK): CPT

## 2020-07-14 PROCEDURE — 83550 IRON BINDING TEST: CPT

## 2020-07-14 PROCEDURE — 82803 BLOOD GASES ANY COMBINATION: CPT

## 2020-07-14 PROCEDURE — 82728 ASSAY OF FERRITIN: CPT

## 2020-07-14 PROCEDURE — 74177 CT ABD & PELVIS W/CONTRAST: CPT

## 2020-07-14 PROCEDURE — 85610 PROTHROMBIN TIME: CPT

## 2020-07-14 PROCEDURE — 84443 ASSAY THYROID STIM HORMONE: CPT

## 2020-07-14 PROCEDURE — 99367 TEAM CONF W/O PAT BY PHYS: CPT | Mod: 25

## 2020-07-14 PROCEDURE — 85025 COMPLETE CBC W/AUTO DIFF WBC: CPT

## 2020-07-14 PROCEDURE — 82042 OTHER SOURCE ALBUMIN QUAN EA: CPT

## 2020-07-14 PROCEDURE — 82962 GLUCOSE BLOOD TEST: CPT

## 2020-07-14 PROCEDURE — 85027 COMPLETE CBC AUTOMATED: CPT

## 2020-07-14 PROCEDURE — 99239 HOSP IP/OBS DSCHRG MGMT >30: CPT | Mod: GC

## 2020-07-14 PROCEDURE — 83735 ASSAY OF MAGNESIUM: CPT

## 2020-07-14 PROCEDURE — 84100 ASSAY OF PHOSPHORUS: CPT

## 2020-07-14 PROCEDURE — 83605 ASSAY OF LACTIC ACID: CPT

## 2020-07-14 PROCEDURE — 82378 CARCINOEMBRYONIC ANTIGEN: CPT

## 2020-07-14 PROCEDURE — 85045 AUTOMATED RETICULOCYTE COUNT: CPT

## 2020-07-14 PROCEDURE — 93970 EXTREMITY STUDY: CPT

## 2020-07-14 PROCEDURE — 87635 SARS-COV-2 COVID-19 AMP PRB: CPT

## 2020-07-14 PROCEDURE — 88305 TISSUE EXAM BY PATHOLOGIST: CPT

## 2020-07-14 PROCEDURE — 88344 IMHCHEM/IMCYTCHM EA MLT ANTB: CPT

## 2020-07-14 PROCEDURE — 82746 ASSAY OF FOLIC ACID SERUM: CPT

## 2020-07-14 PROCEDURE — 88342 IMHCHEM/IMCYTCHM 1ST ANTB: CPT

## 2020-07-14 PROCEDURE — 97161 PT EVAL LOW COMPLEX 20 MIN: CPT

## 2020-07-14 PROCEDURE — A9585: CPT

## 2020-07-14 PROCEDURE — 88112 CYTOPATH CELL ENHANCE TECH: CPT

## 2020-07-14 PROCEDURE — 84157 ASSAY OF PROTEIN OTHER: CPT

## 2020-07-14 PROCEDURE — 88341 IMHCHEM/IMCYTCHM EA ADD ANTB: CPT

## 2020-07-14 PROCEDURE — 83010 ASSAY OF HAPTOGLOBIN QUANT: CPT

## 2020-07-14 PROCEDURE — 71275 CT ANGIOGRAPHY CHEST: CPT

## 2020-07-14 PROCEDURE — 71045 X-RAY EXAM CHEST 1 VIEW: CPT

## 2020-07-14 RX ORDER — POLYETHYLENE GLYCOL 3350 17 G/17G
17 POWDER, FOR SOLUTION ORAL
Qty: 0 | Refills: 0 | DISCHARGE
Start: 2020-07-14

## 2020-07-14 RX ORDER — APIXABAN 2.5 MG/1
2 TABLET, FILM COATED ORAL
Qty: 0 | Refills: 0 | DISCHARGE
Start: 2020-07-14 | End: 2020-07-17

## 2020-07-14 RX ORDER — SENNA PLUS 8.6 MG/1
2 TABLET ORAL
Qty: 0 | Refills: 0 | DISCHARGE
Start: 2020-07-14

## 2020-07-14 RX ORDER — ACETAMINOPHEN 500 MG
2 TABLET ORAL
Qty: 0 | Refills: 0 | DISCHARGE
Start: 2020-07-14

## 2020-07-14 RX ADMIN — GABAPENTIN 300 MILLIGRAM(S): 400 CAPSULE ORAL at 06:35

## 2020-07-14 NOTE — CHART NOTE - NSCHARTNOTEFT_GEN_A_CORE
PALLIATIVE MEDICINE COORDINATION OF CARE NOTE FOR SERGEY KRAUSE    Patient last assessed: 7/13/2020 to manage: Neuropathic pain, Debility, GOC/AD, and support and was recommended:     30 Minutes; Start: 9:30am End: 10:00am, of non-face-to-face prolonged service provided that relates to (face-to-face) care that has or will occur and ongoing patient management, including one or more of the following:   - Reviewed records from other physicians or other health care professional services, including one or more of the following: other medical records and diagnostic / radiology study results     - Other: Medication reviewed.    The patient HAS NOT used PRN's in the last 24h. Patient received 2 doses of Neurontin 300mg and 1 dose of Senna in the last 24h. MME: 0mg    MEDICATIONS  (STANDING):  apixaban 10 milliGRAM(s) Oral every 12 hours  atorvastatin 20 milliGRAM(s) Oral at bedtime  gabapentin 300 milliGRAM(s) Oral two times a day  insulin lispro (HumaLOG) corrective regimen sliding scale   SubCutaneous Before meals and at bedtime  polyethylene glycol 3350 17 Gram(s) Oral every 24 hours  senna 2 Tablet(s) Oral at bedtime    MEDICATIONS  (PRN):  acetaminophen   Tablet .. 650 milliGRAM(s) Oral every 6 hours PRN Mild Pain (1 - 3)    - Other: Advanced directives     DNR DNI     Documented MOLST during this admission and placed in paper chart.      No documented HCP form found on [facility]     Other surrogates: Brother Yunier Krause [phone]     No Living will / POA / Advance directives found on [place] / [facility].     Documented GOC note on [place] on 7/10/2020    - Other: Coordination/Plan of care     1 admissions in 1 year     Current admission LOS: 7 days     LACE score: 9     -Other: Medical team conference      SERGEY KRAUSE case was discussed today during the palliative medicine interdisciplinary team meeting. Time spent 30min. Start: 9:30am End: 10:00am. The patient SERGEY KRAUSE was not present during the meeting. Present in the meeting was myself Dr Guicho Pardo (Palliative attending), Dr Azeem Guzman (Palliative attending), Azeem Davila (NP), [facility]  Sergey Brian (Massage Therapy), Ally Dia (MA Patient and family support), Craigraegan Krueger (), Yousuf Mon (RN [facility]), Rhoda Islas ([facility]), Yaquelin Daniels (Bioethics Fellow), Nancy Cantu (RN [facility]), Dr Chau ().     Patient's care coordination note from SW/CM stating referral IZZY's are being made. Discussed with Mrs Krueger to ensure patient's wishes of being close to his brother in the [place] are heard and considered during [facility] referral process. Uncertain if it will be able to be done given the lack of SARs for [facility]; eg [facility] ([facility] and [facility] ([facility]). She will discuss with primary floor SW/CM. Patient will be seen by massage therapy later today.

## 2020-07-14 NOTE — DISCHARGE NOTE NURSING/CASE MANAGEMENT/SOCIAL WORK - PATIENT PORTAL LINK FT
You can access the FollowMyHealth Patient Portal offered by [facility] by registering at the following website: [email or web address]. By joining [employer]’s FollowMyHealth portal, you will also be able to view your health information using other applications (apps) compatible with our system.

## 2020-07-14 NOTE — PROGRESS NOTE ADULT - SUBJECTIVE AND OBJECTIVE BOX
Patient is a 80y old  Male who presents with a chief complaint of Pulmonary embolism (13 Jul 2020 15:21)      INTERVAL HPI/OVERNIGHT EVENTS:    Pt. seen and examined this morning  Pt. feels well, reports less neuropathic pain in B/L LE  Denies CP, SOB, N/V, abdominal pain  Tolerating PO    Review of Systems: 12 point review of systems otherwise negative    MEDICATIONS  (STANDING):  apixaban 10 milliGRAM(s) Oral every 12 hours  atorvastatin 20 milliGRAM(s) Oral at bedtime  gabapentin 300 milliGRAM(s) Oral two times a day  insulin lispro (HumaLOG) corrective regimen sliding scale   SubCutaneous Before meals and at bedtime  polyethylene glycol 3350 17 Gram(s) Oral every 24 hours  senna 2 Tablet(s) Oral at bedtime    MEDICATIONS  (PRN):  acetaminophen   Tablet .. 650 milliGRAM(s) Oral every 6 hours PRN Mild Pain (1 - 3)      Allergies    No Known Allergies    Intolerances          Vital Signs Last 24 Hrs  T(C): 36.5 (14 Jul 2020 08:47), Max: 37 (14 Jul 2020 05:31)  T(F): 97.7 (14 Jul 2020 08:47), Max: 98.6 (14 Jul 2020 05:31)  HR: 83 (14 Jul 2020 08:47) (82 - 92)  BP: 125/74 (14 Jul 2020 08:47) (104/55 - 125/74)  BP(mean): --  RR: 18 (14 Jul 2020 08:47) (17 - 19)  SpO2: 91% (14 Jul 2020 08:47) (91% - 94%)  CAPILLARY BLOOD GLUCOSE      POCT Blood Glucose.: 217 mg/dL (14 Jul 2020 08:47)  POCT Blood Glucose.: 242 mg/dL (14 Jul 2020 06:55)  POCT Blood Glucose.: 254 mg/dL (13 Jul 2020 22:05)  POCT Blood Glucose.: 235 mg/dL (13 Jul 2020 17:37)      07-13 @ 07:01  -  07-14 @ 07:00  --------------------------------------------------------  IN: 0 mL / OUT: 400 mL / NET: -400 mL        Physical Exam:  (this morning)  Daily     Daily   General:  comfortable-appearing in NAD, +cachectic  HEENT:  MMM  Abdomen:  soft, NT, less distended today  Skin:  WWP  Neuro:  AAOx3      LABS:                        8.2    13.76 )-----------( 291      ( 14 Jul 2020 05:46 )             25.7     07-14    136  |  102  |  13  ----------------------------<  213<H>  4.2   |  24  |  0.62    Ca    7.6<L>      14 Jul 2020 05:46  Phos  2.4     07-14  Mg     2.0     07-14    TPro  6.0  /  Alb  1.7<L>  /  TBili  0.5  /  DBili  x   /  AST  25  /  ALT  15  /  AlkPhos  151<H>  07-14            RADIOLOGY & ADDITIONAL TESTS:    ---------------------------------------------------------------------------  I personally reviewed: [  ]EKG   [  ]CXR    [  ] CT    [  ]Other  ---------------------------------------------------------------------------  PLEASE CHECK WHEN PRESENT:     [  ]Heart Failure     [  ] Acute     [  ] Acute on Chronic     [  ] Chronic  -------------------------------------------------------------------     [  ]Diastolic [HFpEF]     [  ]Systolic [HFrEF]     [  ]Combined [HFpEF & HFrEF]     [  ]Other:  -------------------------------------------------------------------  [  ]BEKAH     [  ]ATN     [  ]Reneal Medullary Necrosis     [  ]Renal Cortical Necrosis     [  ]Other Pathological Lesions:    [  ]CKD 1  [  ]CKD 2  [  ]CKD 3  [  ]CKD 4  [  ]CKD 5  [  ]Other  -------------------------------------------------------------------  [  ]Other/Unspecified:    --------------------------------------------------------------------    Abdominal Nutritional Status  [  ]Malnutrition: See Nutrition Note  [  ]Cachexia  [  ]Other:   [  ]Supplement Ordered:  [  ]Morbid Obesity (BMI >=40]

## 2020-07-14 NOTE — PROGRESS NOTE ADULT - SUBJECTIVE AND OBJECTIVE BOX
Physical Medicine and Rehabilitation Progress Note:    Patient is a 80y old  Male who presents with a chief complaint of Pulmonary embolsim (14 Jul 2020 12:15)      HPI:  79 yo male pmhx DM2 noninsulin dependent bibems for acute onset profound weakness. Pt states he could not support himself last night and guided himself to the floor from which he could not stand up. He states he was on floor approximately 6-7 hours and was finally able to crawl to phone and call 911. Pt also endorses a back pain for 2 months that progressed to a back and stomach pain (does not endorse radiation). Simultaneously he has also noticed that he has decreased energy. Over the course of 3months 20lb weight loss with no change in appetite He denies cp/fever/cough/urinary incontinence/fever/chills. Pt endorses decreased appetite over past 2 months and abdominal distension x 2 months. Denies cp/soa. Denies black/bloody stools. (07 Jul 2020 13:53)                            8.2    13.76 )-----------( 291      ( 14 Jul 2020 05:46 )             25.7       07-14    136  |  102  |  13  ----------------------------<  213<H>  4.2   |  24  |  0.62    Ca    7.6<L>      14 Jul 2020 05:46  Phos  2.4     07-14  Mg     2.0     07-14    TPro  6.0  /  Alb  1.7<L>  /  TBili  0.5  /  DBili  x   /  AST  25  /  ALT  15  /  AlkPhos  151<H>  07-14    Vital Signs Last 24 Hrs  T(C): 36.5 (14 Jul 2020 08:47), Max: 37 (14 Jul 2020 05:31)  T(F): 97.7 (14 Jul 2020 08:47), Max: 98.6 (14 Jul 2020 05:31)  HR: 83 (14 Jul 2020 08:47) (82 - 92)  BP: 125/74 (14 Jul 2020 08:47) (104/55 - 125/74)  BP(mean): --  RR: 18 (14 Jul 2020 08:47) (17 - 19)  SpO2: 91% (14 Jul 2020 08:47) (91% - 94%)    MEDICATIONS  (STANDING):  apixaban 10 milliGRAM(s) Oral every 12 hours  atorvastatin 20 milliGRAM(s) Oral at bedtime  gabapentin 300 milliGRAM(s) Oral two times a day  insulin lispro (HumaLOG) corrective regimen sliding scale   SubCutaneous Before meals and at bedtime  polyethylene glycol 3350 17 Gram(s) Oral every 24 hours  senna 2 Tablet(s) Oral at bedtime    MEDICATIONS  (PRN):  acetaminophen   Tablet .. 650 milliGRAM(s) Oral every 6 hours PRN Mild Pain (1 - 3)    Currently Undergoing Physical/ Occupational Therapy at bedside.    Functional Status Assessment:         Therapeutic Interventions      Bed Mobility  Bed Mobility Training Sit-to-Supine: supervision;  supervision  Bed Mobility Training Supine-to-Sit: supervision;  supervision  Bed Mobility Training Limitations: decreased strength;  decreased ability to use arms for pushing/pulling;  decreased ability to use legs for bridging/pushing;  impaired ability to control trunk for mobility    Sit-Stand Transfer Training  Transfer Training Sit-to-Stand Transfer: contact guard;  verbal cues;  1 person assist;  full weight-bearing   rolling walker  Transfer Training Stand-to-Sit Transfer: contact guard;  verbal cues;  1 person assist;  full weight-bearing   rolling walker  Sit-to-Stand Transfer Training Transfer Safety Analysis: decreased balance;  decreased strength;  rolling walker    Gait Training  Gait Training: contact guard;  verbal cues;  1 person assist;  full weight-bearing   rolling walker;  50 feet  Gait Analysis: 2-point gait   decreased mirza;  increased time in double stance;  decreased strength;  impaired balance;  50 feet;  rolling walker  Gait Number of Times:: x 1    Therapeutic Exercise  Therapeutic Exercise Detail: LAQs, hip abd, hip flex x 10             PM&R Impression: as above    Current Disposition Plan: [facility]

## 2020-07-14 NOTE — PROGRESS NOTE ADULT - ASSESSMENT
per Internal Medicine    79 y/o M w/    Problem/Plan - 1:  ·  Problem: Acute pulmonary embolism without acute cor pulmonale, unspecified pulmonary embolism type.  Plan: PESI Class IV; cont. longterm A/C w/ DOAC; of note, Pt. also has B/L LE DVTs.     Problem/Plan - 2:  ·  Problem: Other ascites.  Plan: s/p IR paracentesis, f/u fluid studies (suspect malignant); monitor abdominal exam, may need PRN therapeutic taps.     Problem/Plan - 3:  ·  Problem: Abnormal MRI of abdomen.  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     Problem/Plan - 4:  ·  Problem: Severe protein-calorie malnutrition.  Plan: cont. Glucerna per Nutrition recs.     Problem/Plan - 5:  ·  Problem: Diabetic polyneuropathy associated with type 2 diabetes mellitus.  Plan: hold metformin inpatient; trial of Neurontin.     Problem/Plan - 6:  Problem: Leukocytosis, unspecified type. Plan: no e/o acute bacterial infection; monitor CBC off abx.

## 2020-07-14 NOTE — PROGRESS NOTE ADULT - PROVIDER SPECIALTY LIST ADULT
Gastroenterology
Gastroenterology
Hospitalist
Internal Medicine
Pulmonology
Rehab Medicine
Rehab Medicine
Internal Medicine
Hospitalist
Internal Medicine
Palliative Care

## 2020-07-17 DIAGNOSIS — Z87.891 PERSONAL HISTORY OF NICOTINE DEPENDENCE: ICD-10-CM

## 2020-07-17 DIAGNOSIS — E43 UNSPECIFIED SEVERE PROTEIN-CALORIE MALNUTRITION: ICD-10-CM

## 2020-07-17 DIAGNOSIS — D72.829 ELEVATED WHITE BLOOD CELL COUNT, UNSPECIFIED: ICD-10-CM

## 2020-07-17 DIAGNOSIS — I82.432 ACUTE EMBOLISM AND THROMBOSIS OF LEFT POPLITEAL VEIN: ICD-10-CM

## 2020-07-17 DIAGNOSIS — I26.99 OTHER PULMONARY EMBOLISM WITHOUT ACUTE COR PULMONALE: ICD-10-CM

## 2020-07-17 DIAGNOSIS — E11.69 TYPE 2 DIABETES MELLITUS WITH OTHER SPECIFIED COMPLICATION: ICD-10-CM

## 2020-07-17 DIAGNOSIS — C25.9 MALIGNANT NEOPLASM OF PANCREAS, UNSPECIFIED: ICD-10-CM

## 2020-07-17 DIAGNOSIS — R53.81 OTHER MALAISE: ICD-10-CM

## 2020-07-17 DIAGNOSIS — J98.11 ATELECTASIS: ICD-10-CM

## 2020-07-17 DIAGNOSIS — Z51.5 ENCOUNTER FOR PALLIATIVE CARE: ICD-10-CM

## 2020-07-17 DIAGNOSIS — Z20.828 CONTACT WITH AND (SUSPECTED) EXPOSURE TO OTHER VIRAL COMMUNICABLE DISEASES: ICD-10-CM

## 2020-07-17 DIAGNOSIS — R80.9 PROTEINURIA, UNSPECIFIED: ICD-10-CM

## 2020-07-17 DIAGNOSIS — J92.9 PLEURAL PLAQUE WITHOUT ASBESTOS: ICD-10-CM

## 2020-07-17 DIAGNOSIS — D63.0 ANEMIA IN NEOPLASTIC DISEASE: ICD-10-CM

## 2020-07-17 DIAGNOSIS — E11.42 TYPE 2 DIABETES MELLITUS WITH DIABETIC POLYNEUROPATHY: ICD-10-CM

## 2020-07-17 DIAGNOSIS — R18.0 MALIGNANT ASCITES: ICD-10-CM

## 2020-07-17 DIAGNOSIS — J90 PLEURAL EFFUSION, NOT ELSEWHERE CLASSIFIED: ICD-10-CM

## 2020-07-17 DIAGNOSIS — I82.453 ACUTE EMBOLISM AND THROMBOSIS OF PERONEAL VEIN, BILATERAL: ICD-10-CM

## 2020-07-17 DIAGNOSIS — Z66 DO NOT RESUSCITATE: ICD-10-CM

## 2020-07-17 DIAGNOSIS — Z79.84 LONG TERM (CURRENT) USE OF ORAL HYPOGLYCEMIC DRUGS: ICD-10-CM

## 2020-07-17 DIAGNOSIS — I82.441 ACUTE EMBOLISM AND THROMBOSIS OF RIGHT TIBIAL VEIN: ICD-10-CM

## 2020-11-16 NOTE — PHYSICAL THERAPY INITIAL EVALUATION ADULT - ADDITIONAL COMMENTS
Pt reports living alone in an elevator access apartment with 0 [place]INE. Pt reports being completely independent with all ADL's and functional mobility without use of an assistive device.
home

## 2021-04-12 NOTE — PHYSICAL THERAPY INITIAL EVALUATION ADULT - LEVEL OF INDEPENDENCE: GAIT, REHAB EVAL
Pt with no acute s/sx of distress noted.
minimum assist (75% patients effort)/moderate assist (50% patients effort)